# Patient Record
Sex: MALE | Race: WHITE | NOT HISPANIC OR LATINO | Employment: FULL TIME | URBAN - METROPOLITAN AREA
[De-identification: names, ages, dates, MRNs, and addresses within clinical notes are randomized per-mention and may not be internally consistent; named-entity substitution may affect disease eponyms.]

---

## 2017-07-18 ENCOUNTER — OFFICE VISIT (OUTPATIENT)
Dept: FAMILY MEDICINE | Facility: CLINIC | Age: 37
End: 2017-07-18

## 2017-07-18 ENCOUNTER — HOSPITAL ENCOUNTER (OUTPATIENT)
Dept: LAB | Facility: CLINIC | Age: 37
Discharge: HOME | End: 2017-07-18
Attending: FAMILY MEDICINE | Admitting: FAMILY MEDICINE
Payer: COMMERCIAL

## 2017-07-18 VITALS
OXYGEN SATURATION: 97 % | TEMPERATURE: 98.4 F | WEIGHT: 183 LBS | HEIGHT: 65 IN | HEART RATE: 65 BPM | BODY MASS INDEX: 30.49 KG/M2 | DIASTOLIC BLOOD PRESSURE: 70 MMHG | SYSTOLIC BLOOD PRESSURE: 110 MMHG

## 2017-07-18 DIAGNOSIS — Z13.1 SCREENING FOR DIABETES MELLITUS: ICD-10-CM

## 2017-07-18 DIAGNOSIS — Z00.00 ROUTINE GENERAL MEDICAL EXAMINATION AT A HEALTH CARE FACILITY: Primary | ICD-10-CM

## 2017-07-18 DIAGNOSIS — Z13.220 LIPID SCREENING: ICD-10-CM

## 2017-07-18 DIAGNOSIS — D69.6 THROMBOCYTOPENIA (H): Primary | ICD-10-CM

## 2017-07-18 DIAGNOSIS — D69.6 THROMBOCYTOPENIA (H): ICD-10-CM

## 2017-07-18 DIAGNOSIS — L80 VITILIGO: ICD-10-CM

## 2017-07-18 LAB
ALBUMIN SERPL-MCNC: 4.1 G/DL (ref 3.4–5)
ALP SERPL-CCNC: 80 U/L (ref 40–150)
ALT SERPL W P-5'-P-CCNC: 126 U/L (ref 0–70)
ANION GAP SERPL CALCULATED.3IONS-SCNC: 9 MMOL/L (ref 3–14)
AST SERPL W P-5'-P-CCNC: 51 U/L (ref 0–45)
BILIRUB SERPL-MCNC: 1.1 MG/DL (ref 0.2–1.3)
BUN SERPL-MCNC: 18 MG/DL (ref 7–30)
CALCIUM SERPL-MCNC: 8.9 MG/DL (ref 8.5–10.1)
CHLORIDE SERPL-SCNC: 110 MMOL/L (ref 94–109)
CHOLEST SERPL-MCNC: 222 MG/DL
CO2 SERPL-SCNC: 24 MMOL/L (ref 20–32)
CREAT SERPL-MCNC: 1.08 MG/DL (ref 0.66–1.25)
ERYTHROCYTE [DISTWIDTH] IN BLOOD BY AUTOMATED COUNT: 15.4 % (ref 10–15)
GFR SERPL CREATININE-BSD FRML MDRD: 77 ML/MIN/1.7M2
GLUCOSE SERPL-MCNC: 96 MG/DL (ref 70–99)
HCT VFR BLD AUTO: 43.8 % (ref 40–53)
HDLC SERPL-MCNC: 56 MG/DL
HGB BLD-MCNC: 15.2 G/DL (ref 13.3–17.7)
LDLC SERPL CALC-MCNC: 141 MG/DL
MCH RBC QN AUTO: 30 PG (ref 26.5–33)
MCHC RBC AUTO-ENTMCNC: 34.7 G/DL (ref 31.5–36.5)
MCV RBC AUTO: 86 FL (ref 78–100)
NONHDLC SERPL-MCNC: 166 MG/DL
PLATELET # BLD AUTO: 32 10E9/L (ref 150–450)
POTASSIUM SERPL-SCNC: 4.4 MMOL/L (ref 3.4–5.3)
PROT SERPL-MCNC: 7 G/DL (ref 6.8–8.8)
RBC # BLD AUTO: 5.07 10E12/L (ref 4.4–5.9)
SODIUM SERPL-SCNC: 143 MMOL/L (ref 133–144)
TRIGL SERPL-MCNC: 126 MG/DL
WBC # BLD AUTO: 9.1 10E9/L (ref 4–11)

## 2017-07-18 PROCEDURE — 80053 COMPREHEN METABOLIC PANEL: CPT | Performed by: FAMILY MEDICINE

## 2017-07-18 PROCEDURE — 99395 PREV VISIT EST AGE 18-39: CPT | Performed by: FAMILY MEDICINE

## 2017-07-18 PROCEDURE — 80061 LIPID PANEL: CPT | Performed by: FAMILY MEDICINE

## 2017-07-18 PROCEDURE — 36416 COLLJ CAPILLARY BLOOD SPEC: CPT | Performed by: FAMILY MEDICINE

## 2017-07-18 PROCEDURE — 85027 COMPLETE CBC AUTOMATED: CPT | Performed by: FAMILY MEDICINE

## 2017-07-18 NOTE — PROGRESS NOTES
SUBJECTIVE:   CC: Caesar Hernandez is an 36 year old male who presents for preventative health visit.     Healthy Habits:    Do you get at least three servings of calcium containing foods daily (dairy, green leafy vegetables, etc.)? yes    Amount of exercise or daily activities, outside of work: 1 day(s) per week    Problems taking medications regularly No    Medication side effects: No    Have you had an eye exam in the past two years? yes    Do you see a dentist twice per year? yes    Do you have sleep apnea, excessive snoring or daytime drowsiness?no            Today's PHQ-2 Score:   PHQ-2 ( 1999 [employer]) 7/18/2017 5/2/2014   Q1: Little interest or pleasure in doing things 0 0   Q2: Feeling down, depressed or hopeless 0 0   PHQ-2 Score 0 0       Abuse: Current or Past(Physical, Sexual or Emotional)- No  Do you feel safe in your environment - Yes    Social History   Substance Use Topics     Smoking status: Never Smoker     Smokeless tobacco: Never Used     Alcohol use Yes      Comment: rare use     The patient does not drink >3 drinks per day nor >7 drinks per week.    Last PSA: No results found for: PSA    Reviewed orders with patient. Reviewed health maintenance and updated orders accordingly - Yes  Labs reviewed in EPIC  BP Readings from Last 3 Encounters:   07/18/17 110/70   12/27/16 118/82   09/21/15 110/70    Wt Readings from Last 3 Encounters:   07/18/17 83 kg (183 lb)   12/27/16 78.5 kg (173 lb)   09/21/15 78.7 kg (173 lb 8 oz)                  Patient Active Problem List   Diagnosis     TAR syndrome: thrombocytopenia and absent radius     Family history of ischemic heart disease     Thrombocytopenia (H)     Health Care Home     ACP (advance care planning)     Foot deformity, congenital     Vitiligo     Past Surgical History:   Procedure Laterality Date     EXTRACTION(S) DENTAL Bilateral 6/24/2015    Procedure: EXTRACTION(S) DENTAL;  Surgeon: Selam Castillo DMD;  Location: [facility] OR     HERNIA REPAIR        TESTICLE SURGERY      undescended on right       Social History   Substance Use Topics     Smoking status: Never Smoker     Smokeless tobacco: Never Used     Alcohol use Yes      Comment: rare use     Family History   Problem Relation Age of Onset     HEART DISEASE Maternal Grandfather      HEART DISEASE Paternal Grandfather      Chemical Addiction Maternal Grandfather      DIABETES No family hx of          Current Outpatient Prescriptions   Medication Sig Dispense Refill     Omeprazole (PRILOSEC PO) Take 20 mg by mouth every morning       order for DME Equipment being ordered: orthotics for both feet, custom 1 Device 0     ELIDEL 1 % cream   3     triamcinolone (KENALOG) 0.1 % cream   2     Allergies   Allergen Reactions     Aspirin      Recent Labs   Lab Test  05/18/15   1055  03/21/14   1004   LDL  111  139*   HDL  56  49   TRIG  93  96   ALT  62   --    CR  0.98   --    GFRESTIMATED  87   --    GFRESTBLACK  >90   GFR Calc     --    POTASSIUM  3.7   --         Reviewed and updated as needed this visit by clinical staff  Tobacco  Allergies  Problems         Reviewed and updated as needed this visit by Provider        Past Medical History:   Diagnosis Date     Coagulation disorder (H)      Exercise-induced asthma 3/20/2014     Gastro-oesophageal reflux disease      Heart murmur      Other chronic pain     foot pain bilat     Uncomplicated asthma       Past Surgical History:   Procedure Laterality Date     EXTRACTION(S) DENTAL Bilateral 6/24/2015    Procedure: EXTRACTION(S) DENTAL;  Surgeon: Selam Castillo DMD;  Location: [facility] OR     HERNIA REPAIR       TESTICLE SURGERY      undescended on right       ROS:  C: NEGATIVE for fever, chills, change in weight  I: NEGATIVE for worrisome rashes, moles or lesions  E: NEGATIVE for vision changes or irritation  ENT: NEGATIVE for ear, mouth and throat problems  R: NEGATIVE for significant cough or SOB  CV: NEGATIVE for chest pain, palpitations or  "peripheral edema  GI: NEGATIVE for nausea, abdominal pain, heartburn, or change in bowel habits   male: negative for dysuria, hematuria, decreased urinary stream, erectile dysfunction, urethral discharge  M: NEGATIVE for significant arthralgias or myalgia  N: NEGATIVE for weakness, dizziness or paresthesias  P: NEGATIVE for changes in mood or affect    OBJECTIVE:   /70 (BP Location: Left arm, Patient Position: Chair, Cuff Size: Adult Regular)  Pulse 65  Temp 98.4  F (36.9  C) (Oral)  Ht 1.651 m (5' 5\")  Wt 83 kg (183 lb)  SpO2 97%  BMI 30.45 kg/m2  EXAM:  GENERAL: healthy, alert and no distress  EYES: Eyes grossly normal to inspection, PERRL and conjunctivae and sclerae normal  HENT: ear canals and TM's normal, nose and mouth without ulcers or lesions  NECK: no adenopathy, no asymmetry, masses, or scars and thyroid normal to palpation  RESP: lungs clear to auscultation - no rales, rhonchi or wheezes  CV: regular rate and rhythm, normal S1 S2, no S3 or S4, no murmur, click or rub, no peripheral edema and peripheral pulses strong  ABDOMEN: soft, nontender, no hepatosplenomegaly, no masses and bowel sounds normal   (male): normal male genitalia without lesions or urethral discharge, no hernia  RECTAL (male): deferred  MS: stable radius deformities-absent radius  SKIN: no suspicious lesions or rashes, slight vitiligo legs and arms  NEURO: Normal strength and tone, mentation intact and speech normal  PSYCH: mentation appears normal, affect normal/bright    ASSESSMENT/PLAN:   (Z00.00) Routine general medical examination at a health care facility  (primary encounter diagnosis)  Comment: discussed preventitive healthcare   Plan: Lipid Profile ([facility]), COMPREHENSIVE METABOLIC         PANEL ([facility]) XCMP, VENOUS COLLECTION        Continue to work on healthy diet and exercise, discussed healthy habits     (D69.6) Thrombocytopenia (H)  Comment: recheck today  Plan: CL AFF HEMOGRAM/PLATE/DIFF (B[facility])        " "    (L80) Vitiligo  Comment: mild flares  Plan: elidel as needed    COUNSELING:  Reviewed preventive health counseling, as reflected in patient instructions       Regular exercise       Healthy diet/nutrition       Vision screening         reports that he has never smoked. He has never used smokeless tobacco.    Estimated body mass index is 30.45 kg/(m^2) as calculated from the following:    Height as of this encounter: 1.651 m (5' 5\").    Weight as of this encounter: 83 kg (183 lb).   Weight management plan: Discussed healthy diet and exercise guidelines and patient will follow up in 12 months in clinic to re-evaluate.    Counseling Resources:  ATP IV Guidelines  Pooled Cohorts Equation Calculator  FRAX Risk Assessment  ICSI Preventive Guidelines  Dietary Guidelines for Americans, 2010  USDA's MyPlate  ASA Prophylaxis  Lung CA Screening    Jose Vicente MD  [facility]  "

## 2017-07-18 NOTE — NURSING NOTE
Caesar is here for CPX fasting    .Patient is here for a full physical exam.    Pre-Visit Screening :  Immunizations : up to date  Colon Screening : NA  Mammogram:NA  Asthma Action Test/Plan : NA states exercise induced only  PHQ9/GAD7 :  None  Fall Risk Assessment NA    Vitals:  Pulse - regular  My Chart - accepts    CLASSIFICATION OF OVERWEIGHT AND OBESITY BY BMI                         Obesity Class           BMI(kg/m2)  Underweight                                    < 18.5  Normal                                         18.5-24.9  Overweight                                     25.0-29.9  OBESITY                     I                  30.0-34.9                              II                 35.0-39.9  EXTREME OBESITY             III                >40                             Patient's  BMI Body mass index is 30.45 kg/(m^2).  http://hin.nhlbi.nih.gov/menuplanner/menu.cgi  Questioned patient about current smoking habits.  Pt. has never smoked.    ETOH screening:  Questions:  1-How often do you have a drink containing alcohol?                             1 times per month(s)  2-How many drinks containing alcohol do you have on a typical day when you are         Drinking?                              1   3- How often do you have 5 or more drinks on one occasion?                              Never    Have you ever:  None of the patient's responses to the CAGE screening were positive / Negative CAGE score

## 2017-07-18 NOTE — MR AVS SNAPSHOT
After Visit Summary   7/18/2017    Caesar Hernandez    MRN: [id number]           Patient Information     Date Of Birth          1980        Visit Information        Provider Department      7/18/2017 9:30 AM Jose Vicente MD [facility]        Today's Diagnoses     Routine general medical examination at a health care facility    -  1    Thrombocytopenia (H)        Vitiligo           Follow-ups after your visit        Follow-up notes from your care team     Return in about 1 year (around 7/18/2018).      Future tests that were ordered for you today     Open Standing Orders        Priority Remaining Interval Expires Ordered    CL AFF HEMOGRAM/PLATE/DIFF ([facility]) Routine 1/1 8/18/2017 7/18/2017    Lipid Profile ([facility]) Routine 1/1 8/18/2017 7/18/2017    COMPREHENSIVE METABOLIC PANEL ([facility]) XCMP Routine 1/1 8/18/2017 7/18/2017    VENOUS COLLECTION Routine 1/1 8/18/2017 7/18/2017            Who to contact     If you have questions or need follow up information about today's clinic visit or your schedule please contact [facility] directly at [phone].  Normal or non-critical lab and imaging results will be communicated to you by Radialpointhart, letter or phone within 4 business days after the clinic has received the results. If you do not hear from us within 7 days, please contact the clinic through Radialpointhart or phone. If you have a critical or abnormal lab result, we will notify you by phone as soon as possible.  Submit refill requests through YODIL or call your pharmacy and they will forward the refill request to us. Please allow 3 business days for your refill to be completed.          Additional Information About Your Visit        MyChart Information     YODIL gives you secure access to your electronic health record. If you see a primary care provider, you can also send messages to your care team and make appointments. If you have questions,  "please call your primary care clinic.  If you do not have a primary care provider, please call [phone] and they will assist you.        Care EveryWhere ID     This is your Care EveryWhere ID. This could be used by other organizations to access your [facility] medical records  [id number]        Your Vitals Were     Pulse Temperature Height Pulse Oximetry BMI (Body Mass Index)       65 98.4  F (36.9  C) (Oral) 1.651 m (5' 5\") 97% 30.45 kg/m2        Blood Pressure from Last 3 Encounters:   07/18/17 110/70   12/27/16 118/82   09/21/15 110/70    Weight from Last 3 Encounters:   07/18/17 83 kg (183 lb)   12/27/16 78.5 kg (173 lb)   09/21/15 78.7 kg (173 lb 8 oz)               Primary Care Provider Office Phone # Fax #    Jose Vicente [phone]       [facility] [address] [facility] [address]  [facility] [address]        Equal Access to Services     [facility]: Hadii aad ku hadasho Soomaali, waaxda luqadaha, qaybta kaalmada adeegyada, waxay idiin hayalfredon kareem angel . So [facility] [phone].    ATENCIÓN: Si habla español, tiene a kramer disposición servicios gratuitos de asistencia lingüística. Llame al [phone].    We comply with applicable federal civil rights laws and [place] laws. We do not discriminate on the basis of race, color, national origin, age, disability sex, sexual orientation or gender identity.            Thank you!     Thank you for choosing [facility]  for your care. Our goal is always to provide you with excellent care. Hearing back from our patients is one way we can continue to improve our services. Please take a few minutes to complete the written survey that you may receive in the mail after your visit with us. Thank you!             Your Updated Medication List - Protect others around you: Learn how to safely use, store and throw away your medicines at www.disposemymeds.org.          This list is accurate as of: " 7/18/17 10:00 AM.  Always use your most recent med list.                   Brand Name Dispense Instructions for use Diagnosis    ELIDEL 1 % cream   Generic drug:  pimecrolimus           order for DME     1 Device    Equipment being ordered: orthotics for both feet, custom    TAR syndrome, Foot deformity, congenital       PRILOSEC PO      Take 20 mg by mouth every morning        triamcinolone 0.1 % cream    KENALOG

## 2017-10-31 ENCOUNTER — OFFICE VISIT (OUTPATIENT)
Dept: FAMILY MEDICINE | Facility: CLINIC | Age: 37
End: 2017-10-31

## 2017-10-31 VITALS
WEIGHT: 179.2 LBS | RESPIRATION RATE: 20 BRPM | BODY MASS INDEX: 29.85 KG/M2 | SYSTOLIC BLOOD PRESSURE: 114 MMHG | TEMPERATURE: 98.5 F | HEIGHT: 65 IN | DIASTOLIC BLOOD PRESSURE: 74 MMHG

## 2017-10-31 DIAGNOSIS — H10.9 BACTERIAL CONJUNCTIVITIS OF LEFT EYE: Primary | ICD-10-CM

## 2017-10-31 PROCEDURE — 99213 OFFICE O/P EST LOW 20 MIN: CPT | Performed by: PHYSICIAN ASSISTANT

## 2017-10-31 RX ORDER — CIPROFLOXACIN HYDROCHLORIDE 3.5 MG/ML
SOLUTION/ DROPS TOPICAL
Qty: 1 BOTTLE | Refills: 0 | Status: SHIPPED | OUTPATIENT
Start: 2017-10-31 | End: 2017-11-05

## 2017-10-31 NOTE — MR AVS SNAPSHOT
"              After Visit Summary   10/31/2017    Caesar Hernandez    MRN: [id number]           Patient Information     Date Of Birth          1980        Visit Information        Provider Department      10/31/2017 10:00 AM Chelsea Mosqueda PA-C [facility]        Today's Diagnoses     Bacterial conjunctivitis of left eye    -  1       Follow-ups after your visit        Follow-up notes from your care team     Return if symptoms worsen or fail to improve.      Who to contact     If you have questions or need follow up information about today's clinic visit or your schedule please contact [facility] directly at [phone].  Normal or non-critical lab and imaging results will be communicated to you by MyChart, letter or phone within 4 business days after the clinic has received the results. If you do not hear from us within 7 days, please contact the clinic through Roadhophart or phone. If you have a critical or abnormal lab result, we will notify you by phone as soon as possible.  Submit refill requests through OpenLabel or call your pharmacy and they will forward the refill request to us. Please allow 3 business days for your refill to be completed.          Additional Information About Your Visit        MyChart Information     OpenLabel gives you secure access to your electronic health record. If you see a primary care provider, you can also send messages to your care team and make appointments. If you have questions, please call your primary care clinic.  If you do not have a primary care provider, please call [phone] and they will assist you.        Care EveryWhere ID     This is your Care EveryWhere ID. This could be used by other organizations to access your [facility] medical records  [id number]        Your Vitals Were     Temperature Respirations Height BMI (Body Mass Index)          98.5  F (36.9  C) (Oral) 20 1.657 m (5' 5.25\") 29.59 kg/m2         Blood " Pressure from Last 3 Encounters:   10/31/17 114/74   07/18/17 110/70   12/27/16 118/82    Weight from Last 3 Encounters:   10/31/17 81.3 kg (179 lb 3.2 oz)   07/18/17 83 kg (183 lb)   12/27/16 78.5 kg (173 lb)              Today, you had the following     No orders found for display         Today's Medication Changes          These changes are accurate as of: 10/31/17  1:48 PM.  If you have any questions, ask your nurse or doctor.               Start taking these medicines.        Dose/Directions    ciprofloxacin 0.3 % ophthalmic solution   Commonly known as:  CILOXAN   Used for:  Bacterial conjunctivitis of left eye   Started by:  Chelsea Mosqueda PA-C        Instill 1-2 drops in the affected eye(s) every 4 hours (3-4 times daily) for 5-7 days.   Quantity:  1 Bottle   Refills:  0            Where to get your medicines      These medications were sent to [facility] [id number] [address]    Hours:  Tech issues with their phone system Phone:  [phone]     ciprofloxacin 0.3 % ophthalmic solution                Primary Care Provider Office Phone # Fax #    Jose Vicente [phone] [address] [address]        Equal Access to Services     [facility]: Hadii aad ku hadasho Soomaali, waaxda luqadaha, qaybta kaalmada adeegyada, inge haque. So [facility] [phone].    ATENCIÓN: Si habla español, tiene a kramer disposición servicios gratuitos de asistencia lingüística. Aileen al [phone].    We comply with applicable federal civil rights laws and [place] laws. We do not discriminate on the basis of race, color, national origin, age, disability, sex, sexual orientation, or gender identity.            Thank you!     Thank you for choosing [facility]  for your care. Our goal is always to provide you with excellent care. Hearing back from  our patients is one way we can continue to improve our services. Please take a few minutes to complete the written survey that you may receive in the mail after your visit with us. Thank you!             Your Updated Medication List - Protect others around you: Learn how to safely use, store and throw away your medicines at www.disposemymeds.org.          This list is accurate as of: 10/31/17  1:48 PM.  Always use your most recent med list.                   Brand Name Dispense Instructions for use Diagnosis    ciprofloxacin 0.3 % ophthalmic solution    CILOXAN    1 Bottle    Instill 1-2 drops in the affected eye(s) every 4 hours (3-4 times daily) for 5-7 days.    Bacterial conjunctivitis of left eye       ELIDEL 1 % cream   Generic drug:  pimecrolimus           order for DME     1 Device    Equipment being ordered: orthotics for both feet, custom    TAR syndrome, Foot deformity, congenital       PRILOSEC PO      Take 20 mg by mouth every morning        triamcinolone 0.1 % cream    KENALOG

## 2017-10-31 NOTE — PROGRESS NOTES
Chief Complaint   Patient presents with     Eye Problem     Conjunctivitis        HPI  Caesar Hernandez is a 37 year old male with concern about the eye.    Symptoms include burning, redness, discharge and mattering in left eye for 2 days.     He  does complain of itching and/or tearing.     Vision affected? Yes  Foreign body sensation? (corneal process)  No  Photophobia? No  Trauma? No  Wear contact lenses? Yes  Discharge? Yes  Known Exposure? Yes    Prior ophthalmological history.     Past Medical History:   Diagnosis Date     Coagulation disorder (H)      Exercise-induced asthma 3/20/2014     Gastro-oesophageal reflux disease      Heart murmur      Other chronic pain     foot pain bilat     Uncomplicated asthma      Family History   Problem Relation Age of Onset     HEART DISEASE Maternal Grandfather      HEART DISEASE Paternal Grandfather      Chemical Addiction Maternal Grandfather      DIABETES No family hx of      Social History     Social History     Marital status:      Spouse name: reny     Number of children: 1     Years of education: N/A     Occupational History     teacher      [employer]     Social History Main Topics     Smoking status: Never Smoker     Smokeless tobacco: Never Used     Alcohol use Yes      Comment: rare use     Drug use: No     Sexual activity: Yes     Partners: Female     Other Topics Concern     Not on file     Social History Narrative     Patient Active Problem List   Diagnosis     TAR syndrome: thrombocytopenia and absent radius     Family history of ischemic heart disease     Thrombocytopenia (H)     Health Care Home     ACP (advance care planning)     Foot deformity, congenital     Vitiligo     Current Outpatient Prescriptions   Medication     ciprofloxacin (CILOXAN) 0.3 % ophthalmic solution     order for DME     ELIDEL 1 % cream     triamcinolone (KENALOG) 0.1 % cream     Omeprazole (PRILOSEC PO)     No current facility-administered medications for this visit.   "      Allergies:    Allergies   Allergen Reactions     Aspirin        OBJECTIVE    Vitals:    10/31/17 1006   BP: 114/74   BP Location: Left arm   Patient Position: Chair   Cuff Size: Adult Regular   Resp: 20   Temp: 98.5  F (36.9  C)   TempSrc: Oral   Weight: 81.3 kg (179 lb 3.2 oz)   Height: 1.657 m (5' 5.25\")       PHYSICAL EXAMINATION    Patient is pleasant appearing 37 year old male , in no acute distress. Vitals noted.      General: Patient appears well.   Eyes: left eye with findings of typical conjunctivitis noted; erythema. Discharge is not present. PERRLA, no foreign body noted. No periorbital cellulitis. The corneas are clear and fundi normal.   Pupil size normal  Discharge not present.  Ears: External ears and TMs normal BL.  Nose: Nasal mucosa pink and moist. No discharge.  Mouth / Throat: Mucous membranes moist. Normal dentition.  Pharynx non-erythematous, no exudates.   Neck: Supple, No thyromegaly or nodules. No lymphadenopathy.      ASSESSMENT  1. Bacterial conjunctivitis of left eye          PLAN  Antibiotic drops per order. Hygiene discussed.     Caesar will return to the clinic if symptoms are changing or concern with follow up as discussed.  The patient understands and agrees with the plan.    Chelsea Mosqueda"

## 2017-11-01 ENCOUNTER — OFFICE VISIT (OUTPATIENT)
Dept: FAMILY MEDICINE | Facility: CLINIC | Age: 37
End: 2017-11-01

## 2017-11-01 VITALS
OXYGEN SATURATION: 91 % | TEMPERATURE: 98.8 F | DIASTOLIC BLOOD PRESSURE: 76 MMHG | SYSTOLIC BLOOD PRESSURE: 104 MMHG | BODY MASS INDEX: 29.26 KG/M2 | HEART RATE: 96 BPM | WEIGHT: 177.2 LBS

## 2017-11-01 DIAGNOSIS — J06.9 UPPER RESPIRATORY TRACT INFECTION, UNSPECIFIED TYPE: Primary | ICD-10-CM

## 2017-11-01 PROCEDURE — 99213 OFFICE O/P EST LOW 20 MIN: CPT | Performed by: FAMILY MEDICINE

## 2017-11-01 NOTE — MR AVS SNAPSHOT
After Visit Summary   11/1/2017    Caesar Hernandez    MRN: [id number]           Patient Information     Date Of Birth          1980        Visit Information        Provider Department      11/1/2017 5:15 PM Jules, Jose Christopher, MD [facility]        Today's Diagnoses     Upper respiratory tract infection, unspecified type    -  1       Follow-ups after your visit        Who to contact     If you have questions or need follow up information about today's clinic visit or your schedule please contact [facility] directly at [phone].  Normal or non-critical lab and imaging results will be communicated to you by rVitahart, letter or phone within 4 business days after the clinic has received the results. If you do not hear from us within 7 days, please contact the clinic through rVitahart or phone. If you have a critical or abnormal lab result, we will notify you by phone as soon as possible.  Submit refill requests through CanDiag or call your pharmacy and they will forward the refill request to us. Please allow 3 business days for your refill to be completed.          Additional Information About Your Visit        MyChart Information     CanDiag gives you secure access to your electronic health record. If you see a primary care provider, you can also send messages to your care team and make appointments. If you have questions, please call your primary care clinic.  If you do not have a primary care provider, please call [phone] and they will assist you.        Care EveryWhere ID     This is your Care EveryWhere ID. This could be used by other organizations to access your [place] medical records  [id number]        Your Vitals Were     Pulse Temperature Pulse Oximetry BMI (Body Mass Index)          96 98.8  F (37.1  C) (Oral) 91% 29.26 kg/m2         Blood Pressure from Last 3 Encounters:   11/01/17 104/76   10/31/17 114/74   07/18/17 110/70     Weight from Last 3 Encounters:   11/01/17 80.4 kg (177 lb 3.2 oz)   10/31/17 81.3 kg (179 lb 3.2 oz)   07/18/17 83 kg (183 lb)              Today, you had the following     No orders found for display       Primary Care Provider Office Phone # Fax #    Jose Moisés Vicente [phone] [address] [facility] [address]  [facility] [address]        Equal Access to Services     [facility] : Hadii aad ku hadasho Soomaali, waaxda luqadaha, qaybta kaalmada adeegyada, waxay idiin hayaan adeeg kharash la'cody . So [facility] [phone].    ATENCIÓN: Si habla español, tiene a kramer disposición servicios gratuitos de asistencia lingüística. [facility] [phone].    We comply with applicable federal civil rights laws and [place] laws. We do not discriminate on the basis of race, color, national origin, age, disability, sex, sexual orientation, or gender identity.            Thank you!     Thank you for choosing [facility]  for your care. Our goal is always to provide you with excellent care. Hearing back from our patients is one way we can continue to improve our services. Please take a few minutes to complete the written survey that you may receive in the mail after your visit with us. Thank you!             Your Updated Medication List - Protect others around you: Learn how to safely use, store and throw away your medicines at www.disposemymeds.org.          This list is accurate as of: 11/1/17  5:22 PM.  Always use your most recent med list.                   Brand Name Dispense Instructions for use Diagnosis    ciprofloxacin 0.3 % ophthalmic solution    CILOXAN    1 Bottle    Instill 1-2 drops in the affected eye(s) every 4 hours (3-4 times daily) for 5-7 days.    Bacterial conjunctivitis of left eye       ELIDEL 1 % cream   Generic drug:  pimecrolimus           order for DME     1 Device    Equipment being ordered: orthotics for both feet, custom    TAR syndrome, Foot deformity,  congenital       PRILOSEC PO      Take 20 mg by mouth every morning        triamcinolone 0.1 % cream    KENALOG

## 2017-11-01 NOTE — PROGRESS NOTES
SUBJECTIVE:   Caesar Hernandez is a 37 year old male who complains of nasal congestion, sore throat, earache, cough, mattering conjuntivitis on both sides and hoarseness for 1-2 days-was seen here for conjuncitvitis yesterday but all the other symptoms started today. He denies a history of productive cough, sweats, wheezing, vomiting and chest pain and denies a history of asthma. Patient does not smoke cigarettes.    Dayquil, Tylenol x 1 today     OBJECTIVE:/76 (BP Location: Right leg, Patient Position: Chair, Cuff Size: Adult Regular)  Pulse 96  Temp 98.8  F (37.1  C) (Oral)  Wt 80.4 kg (177 lb 3.2 oz)  SpO2 91%  BMI 29.26 kg/m2   He appears well, vital signs are as noted by the nurse. Ears right wnl, left dull but no erythema, appears to have fluid pressure.  Throat and pharynx normal.  Neck supple. No adenopathy in the neck. Nose is congested. Sinuses non tender. The chest is clear, without wheezes or rales.    ASSESSMENT:   Viral upper respiratory illness    PLAN:  Symptomatic therapy suggested: push fluids, rest and use acetaminophen as needed. Call or return to clinic prn if these symptoms worsen or fail to improve as anticipated.     If left ear pain increases would likely treat for infection without OV

## 2017-11-01 NOTE — NURSING NOTE
Caesar is here for sore throat     Pre-Visit Screening :  Immunizations : up to date  Colon Screening : is up to date  Asthma Action Test/Plan : NA  PHQ9/GAD7 :  NA    Pulse - regular  My Chart - accepts    CLASSIFICATION OF OVERWEIGHT AND OBESITY BY BMI                         Obesity Class           BMI(kg/m2)  Underweight                                    < 18.5  Normal                                         18.5-24.9  Overweight                                     25.0-29.9  OBESITY                     I                  30.0-34.9                              II                 35.0-39.9  EXTREME OBESITY             III                >40                             Patient's  BMI Body mass index is 29.26 kg/(m^2).  http://hin.nhlbi.nih.gov/menuplanner/menu.cgi  Questioned patient about current smoking habits.  Pt. has never smoked.

## 2017-11-05 ENCOUNTER — OFFICE VISIT (OUTPATIENT)
Dept: URGENT CARE | Facility: URGENT CARE | Age: 37
End: 2017-11-05
Payer: COMMERCIAL

## 2017-11-05 ENCOUNTER — RADIANT APPOINTMENT (OUTPATIENT)
Dept: GENERAL RADIOLOGY | Facility: CLINIC | Age: 37
End: 2017-11-05
Attending: FAMILY MEDICINE
Payer: COMMERCIAL

## 2017-11-05 VITALS
OXYGEN SATURATION: 98 % | WEIGHT: 177 LBS | HEART RATE: 114 BPM | DIASTOLIC BLOOD PRESSURE: 68 MMHG | BODY MASS INDEX: 29.23 KG/M2 | TEMPERATURE: 99.3 F | SYSTOLIC BLOOD PRESSURE: 104 MMHG | RESPIRATION RATE: 14 BRPM

## 2017-11-05 DIAGNOSIS — R05.9 COUGH: Primary | ICD-10-CM

## 2017-11-05 DIAGNOSIS — R07.81 RIB PAIN: ICD-10-CM

## 2017-11-05 DIAGNOSIS — R05.9 COUGH: ICD-10-CM

## 2017-11-05 PROCEDURE — 71020 XR CHEST 2 VW: CPT

## 2017-11-05 PROCEDURE — 99214 OFFICE O/P EST MOD 30 MIN: CPT | Performed by: FAMILY MEDICINE

## 2017-11-05 RX ORDER — PREDNISONE 20 MG/1
40 TABLET ORAL DAILY
Qty: 10 TABLET | Refills: 0 | Status: SHIPPED | OUTPATIENT
Start: 2017-11-05 | End: 2017-11-10

## 2017-11-05 RX ORDER — CODEINE PHOSPHATE AND GUAIFENESIN 10; 100 MG/5ML; MG/5ML
1 SOLUTION ORAL EVERY 4 HOURS PRN
Qty: 120 ML | Refills: 0 | Status: SHIPPED | OUTPATIENT
Start: 2017-11-05 | End: 2018-08-02

## 2017-11-05 RX ORDER — AZITHROMYCIN 250 MG/1
TABLET, FILM COATED ORAL
Qty: 6 TABLET | Refills: 0 | Status: SHIPPED | OUTPATIENT
Start: 2017-11-05 | End: 2018-08-02

## 2017-11-05 NOTE — PROGRESS NOTES
SUBJECTIVE:  Caesar Hernandez, a 37 year old male scheduled an appointment to discuss the following issues:    Cough    Medical, social, surgical, and family histories reviewed.    ROS:  CONSTITUTIONAL:low grade fever  E: NEGATIVE for vision changes   RESP:chronic cough and coughing jags  CV: NEGATIVE for chest pain, palpitations   GI: NEGATIVE for nausea, abdominal pain, heartburn, or change in bowel habits  : NEGATIVE for frequency, dysuria, or hematuria  M: NEGATIVE for significant arthralgias or myalgia  N: NEGATIVE for weakness, dizziness or paresthesias or headache    OBJECTIVE:  /68 (BP Location: Right arm, Cuff Size: Adult Large)  Pulse 114  Temp 99.3  F (37.4  C) (Oral)  Resp 14  Wt 177 lb (80.3 kg)  SpO2 98%  BMI 29.23 kg/m2  EXAM:  GENERAL APPEARANCE: mild distress  EYES: EOMI,  PERRL  HENT: ear canals and TM's normal and nose and mouth without ulcers or lesions  RESP: course sounds  CV: regular rates and rhythm, normal S1 S2, no S3 or S4 and no murmur, click or rub -  ABDOMEN:  soft, nontender, no HSM or masses and bowel sounds normal  NEURO: Normal strength and tone, sensory exam grossly normal, mentation intact and speech normal      Results for orders placed or performed during the hospital encounter of 07/18/17   CBC with platelets   Result Value Ref Range    WBC 9.1 4.0 - 11.0 10e9/L    RBC Count 5.07 4.4 - 5.9 10e12/L    Hemoglobin 15.2 13.3 - 17.7 g/dL    Hematocrit 43.8 40.0 - 53.0 %    MCV 86 78 - 100 fl    MCH 30.0 26.5 - 33.0 pg    MCHC 34.7 31.5 - 36.5 g/dL    RDW 15.4 (H) 10.0 - 15.0 %    Platelet Count 32 (LL) 150 - 450 10e9/L   Comprehensive metabolic panel (BMP + Alb, Alk Phos, ALT, AST, Total. Bili, TP)   Result Value Ref Range    Sodium 143 133 - 144 mmol/L    Potassium 4.4 3.4 - 5.3 mmol/L    Chloride 110 (H) 94 - 109 mmol/L    Carbon Dioxide 24 20 - 32 mmol/L    Anion Gap 9 3 - 14 mmol/L    Glucose 96 70 - 99 mg/dL    Urea Nitrogen 18 7 - 30 mg/dL    Creatinine 1.08 0.66 -  1.25 mg/dL    GFR Estimate 77 >60 mL/min/1.7m2    GFR Estimate If Black >90   GFR Calc   >60 mL/min/1.7m2    Calcium 8.9 8.5 - 10.1 mg/dL    Bilirubin Total 1.1 0.2 - 1.3 mg/dL    Albumin 4.1 3.4 - 5.0 g/dL    Protein Total 7.0 6.8 - 8.8 g/dL    Alkaline Phosphatase 80 40 - 150 U/L     (H) 0 - 70 U/L    AST 51 (H) 0 - 45 U/L   Lipid Profile (Chol, Trig, HDL, LDL calc)   Result Value Ref Range    Cholesterol 222 (H) <200 mg/dL    Triglycerides 126 <150 mg/dL    HDL Cholesterol 56 >39 mg/dL    LDL Cholesterol Calculated 141 (H) <100 mg/dL    Non HDL Cholesterol 166 (H) <130 mg/dL     CXR; lung fields are clear, no evidence of a fracture in the ribs. No evidence for pneumothorax    ASSESSMENT/PLAN:  (R05) Cough  (primary encounter diagnosis)  Comment:   Plan:     37-year-old with cough symptomatic of chest pain that is probably from his chronic cough.    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

## 2017-11-05 NOTE — NURSING NOTE
"Caesar Hernandez is a 37 year old male.      Chief Complaint   Patient presents with     Urgent Care     Cough     was seen on Mon and was treated for eye infection - then seen again on Wed and thought it was a virus - now the cough is getting wrose and running a fever - facial pain and pressure and ear pain       Initial /68 (BP Location: Right arm, Cuff Size: Adult Large)  Pulse 114  Temp 99.3  F (37.4  C) (Oral)  Resp 14  Wt 177 lb (80.3 kg)  SpO2 98%  BMI 29.23 kg/m2 Estimated body mass index is 29.23 kg/(m^2) as calculated from the following:    Height as of 10/31/17: 5' 5.25\" (1.657 m).    Weight as of this encounter: 177 lb (80.3 kg).  Medication Reconciliation: complete      Questioned patient about current smoking habits.  Pt. has never smoked.      Lizz Quintanilla CMA      "

## 2017-11-05 NOTE — PATIENT INSTRUCTIONS
1. Increase your Fluids  2. Cough medication mainly at night to suppress this to allow you to sleep  3. Antibiotics  4. Steroid medication for suppression of the irritation in your lungs  5. Follow-up with Dr. Vicente within a week

## 2017-11-05 NOTE — MR AVS SNAPSHOT
After Visit Summary   11/5/2017    Caesar Hernandez    MRN: [id number]           Patient Information     Date Of Birth          1980        Visit Information        Provider Department      11/5/2017 9:50 AM Alejo Clark MD [facility]        Today's Diagnoses     Cough    -  1      Care Instructions    1. Increase your Fluids  2. Cough medication mainly at night to suppress this to allow you to sleep  3. Antibiotics  4. Steroid medication for suppression of the irritation in your lungs  5. Follow-up with Dr. Vicente within a week          Follow-ups after your visit        Who to contact     If you have questions or need follow up information about today's clinic visit or your schedule please contact [facility] directly at [phone].  Normal or non-critical lab and imaging results will be communicated to you by CareFlashhart, letter or phone within 4 business days after the clinic has received the results. If you do not hear from us within 7 days, please contact the clinic through WHI Solutiont or phone. If you have a critical or abnormal lab result, we will notify you by phone as soon as possible.  Submit refill requests through Fusion Garage or call your pharmacy and they will forward the refill request to us. Please allow 3 business days for your refill to be completed.          Additional Information About Your Visit        MyChart Information     Fusion Garage gives you secure access to your electronic health record. If you see a primary care provider, you can also send messages to your care team and make appointments. If you have questions, please call your primary care clinic.  If you do not have a primary care provider, please call [phone] and they will assist you.        Care EveryWhere ID     This is your Care EveryWhere ID. This could be used by other organizations to access your [facility] medical records  [id number]        Your Vitals Were     Pulse Temperature  Respirations Pulse Oximetry BMI (Body Mass Index)       114 99.3  F (37.4  C) (Oral) 14 98% 29.23 kg/m2        Blood Pressure from Last 3 Encounters:   11/05/17 104/68   11/01/17 104/76   10/31/17 114/74    Weight from Last 3 Encounters:   11/05/17 177 lb (80.3 kg)   11/01/17 177 lb 3.2 oz (80.4 kg)   10/31/17 179 lb 3.2 oz (81.3 kg)                 Today's Medication Changes          These changes are accurate as of: 11/5/17 10:31 AM.  If you have any questions, ask your nurse or doctor.               Start taking these medicines.        Dose/Directions    acetaminophen-codeine 300-30 MG per tablet   Commonly known as:  TYLENOL #3   Used for:  Cough   Started by:  Alejo Clark MD        Dose:  1 tablet   Take 1 tablet by mouth every 6 hours as needed for pain maximum 3 tablet(s) per day   Quantity:  10 tablet   Refills:  0       azithromycin 250 MG tablet   Commonly known as:  ZITHROMAX   Used for:  Cough   Started by:  Alejo Clark MD        Two tablets first day, then one tablet daily for four days.   Quantity:  6 tablet   Refills:  0       guaiFENesin-codeine 100-10 MG/5ML Soln solution   Commonly known as:  ROBITUSSIN AC   Used for:  Cough   Started by:  Alejo Clark MD        Dose:  1 tsp.   Take 5 mLs by mouth every 4 hours as needed for cough   Quantity:  120 mL   Refills:  0       predniSONE 20 MG tablet   Commonly known as:  DELTASONE   Used for:  Cough   Started by:  Alejo Clark MD        Dose:  40 mg   Take 2 tablets (40 mg) by mouth daily for 5 days   Quantity:  10 tablet   Refills:  0            Where to get your medicines      These medications were sent to [facility]/pharmacy #[id number] - [place] - [address]  [address]  [address], [facility] [address]     Phone:  [phone]     azithromycin 250 MG tablet    predniSONE 20 MG tablet         Some of these will need a paper prescription and others can be bought over the counter.  Ask your nurse if you have questions.     Bring a  paper prescription for each of these medications     acetaminophen-codeine 300-30 MG per tablet    guaiFENesin-codeine 100-10 MG/5ML Soln solution                Primary Care Provider Office Phone # Fax #    Jose Vicente [phone] [address] [facility] [address]  [facility] [address]        Equal Access to Services     [facility]: Hadii aad ku hadasho Soomaali, waaxda luqadaha, qaybta kaalmada adeegyada, waxay idiin hayaan adeeg kharash la'aan . So [facility] [phone].    ATENCIÓN: Si habla español, tiene a kramer disposición servicios gratuitos de asistencia lingüística. Aileen al [phone].    We comply with applicable federal civil rights laws and [place] laws. We do not discriminate on the basis of race, color, national origin, age, disability, sex, sexual orientation, or gender identity.            Thank you!     Thank you for choosing [facility]  for your care. Our goal is always to provide you with excellent care. Hearing back from our patients is one way we can continue to improve our services. Please take a few minutes to complete the written survey that you may receive in the mail after your visit with us. Thank you!             Your Updated Medication List - Protect others around you: Learn how to safely use, store and throw away your medicines at www.disposemymeds.org.          This list is accurate as of: 11/5/17 10:31 AM.  Always use your most recent med list.                   Brand Name Dispense Instructions for use Diagnosis    acetaminophen-codeine 300-30 MG per tablet    TYLENOL #3    10 tablet    Take 1 tablet by mouth every 6 hours as needed for pain maximum 3 tablet(s) per day    Cough       azithromycin 250 MG tablet    ZITHROMAX    6 tablet    Two tablets first day, then one tablet daily for four days.    Cough       ELIDEL 1 % cream   Generic drug:  pimecrolimus           guaiFENesin-codeine 100-10 MG/5ML Soln solution    ROBITUSSIN AC     120 mL    Take 5 mLs by mouth every 4 hours as needed for cough    Cough       order for DME     1 Device    Equipment being ordered: orthotics for both feet, custom    TAR syndrome, Foot deformity, congenital       predniSONE 20 MG tablet    DELTASONE    10 tablet    Take 2 tablets (40 mg) by mouth daily for 5 days    Cough       PRILOSEC PO      Take 20 mg by mouth every morning        triamcinolone 0.1 % cream    KENALOG

## 2017-11-23 ENCOUNTER — TELEPHONE (OUTPATIENT)
Dept: URGENT CARE | Facility: URGENT CARE | Age: 37
End: 2017-11-23

## 2017-11-23 NOTE — TELEPHONE ENCOUNTER
Patient called today.    Patient is requesting medications Predizone and Zythomax.    Patient still has persistent cough.    Please contact patient.    Thank you.    Central Scheduling  Kerri BARKER

## 2017-11-24 ENCOUNTER — TELEPHONE (OUTPATIENT)
Dept: FAMILY MEDICINE | Facility: CLINIC | Age: 37
End: 2017-11-24

## 2017-11-24 ENCOUNTER — NURSE TRIAGE (OUTPATIENT)
Dept: NURSING | Facility: CLINIC | Age: 37
End: 2017-11-24

## 2017-11-24 NOTE — TELEPHONE ENCOUNTER
Additional Information    Negative: [1] Caller is not with the adult (patient) AND [2] reporting urgent symptoms    Negative: Lab result questions    Negative: Medication questions    Negative: Caller cannot be reached by phone    Negative: Caller has already spoken to PCP or another triager    Negative: RN needs further essential information from caller in order to complete triage    Negative: Requesting regular office appointment    Negative: [1] Caller requesting NON-URGENT health information AND [2] PCP's office is the best resource    Negative: Health Information question, no triage required and triager able to answer question    Negative: General information question, no triage required and triager able to answer question    Negative: Question about upcoming scheduled test, no triage required and triager able to answer question    Negative: [1] Caller is not with the adult (patient) AND [2] probable NON-URGENT symptoms    [1] Follow-up call to recent contact AND [2] information only call, no triage required    Protocols used: INFORMATION ONLY CALL-ADULT-    Caesar calls and says that [facility], and was prescribed medications. Caesar says that he thought he was getting better, but he is still coughing. Caesar says that he wants the medications that the  ordered for him reordered. Caesar refused triage and was demanding to get those medications. RN then called that [facility] clinic and spoke to Alondra (nurse). Alondra says that she will call Caesar back now. RN then gave Alondra Maynard's phone #. RN spoke to Caesar and told Caesar that Alondra was going to call him back very shortly. Caesar voiced understanding.

## 2017-11-24 NOTE — TELEPHONE ENCOUNTER
Caesar Hernandez called the clinic support line with the following:    States that he needs a refill of Z-marge and Prednisone. Called Pt back and advised an OV here since we did not prescribe. He will wait for UC to call him back

## 2017-11-25 ENCOUNTER — OFFICE VISIT (OUTPATIENT)
Dept: FAMILY MEDICINE | Facility: CLINIC | Age: 37
End: 2017-11-25

## 2017-11-25 VITALS
DIASTOLIC BLOOD PRESSURE: 56 MMHG | TEMPERATURE: 98.3 F | RESPIRATION RATE: 16 BRPM | OXYGEN SATURATION: 94 % | WEIGHT: 174.6 LBS | HEART RATE: 88 BPM | BODY MASS INDEX: 29.09 KG/M2 | HEIGHT: 65 IN | SYSTOLIC BLOOD PRESSURE: 104 MMHG

## 2017-11-25 DIAGNOSIS — K52.9 GASTROENTERITIS: ICD-10-CM

## 2017-11-25 DIAGNOSIS — J06.9 VIRAL UPPER RESPIRATORY TRACT INFECTION: ICD-10-CM

## 2017-11-25 DIAGNOSIS — J20.9 ACUTE BRONCHITIS WITH SYMPTOMS > 10 DAYS: Primary | ICD-10-CM

## 2017-11-25 DIAGNOSIS — F43.0 ACUTE REACTION TO STRESS: ICD-10-CM

## 2017-11-25 PROCEDURE — 99213 OFFICE O/P EST LOW 20 MIN: CPT | Performed by: FAMILY MEDICINE

## 2017-11-25 RX ORDER — AZITHROMYCIN 250 MG/1
TABLET, FILM COATED ORAL
Qty: 6 TABLET | Refills: 0 | Status: SHIPPED | OUTPATIENT
Start: 2017-11-25 | End: 2018-08-02

## 2017-11-25 RX ORDER — MONTELUKAST SODIUM 10 MG/1
10 TABLET ORAL AT BEDTIME
Qty: 30 TABLET | Refills: 0 | Status: SHIPPED | OUTPATIENT
Start: 2017-11-25 | End: 2019-03-25

## 2017-11-25 NOTE — PATIENT INSTRUCTIONS
Acute bronchitis with symptoms > 10 days  (primary encounter diagnosis)  Comment: inflammation reviewed versus bacterial infection  Plan: azithromycin (ZITHROMAX) 250 MG tablet,         montelukast (SINGULAIR) 10 MG tablet        Symptomatic care with decongestants, fluids, tylenol prn. Use GUAIFENESIN  MG OR TBCR, 1 tab po BID (Twice per day), D: 20, R: 0 for congestion and cough.    In addition, I have suggested that the patient   monitor for symptoms of bacterial infection expecting slow gradual resolution of viral URI as the natural course.  Follow diarrhea diet and monitor for any worrisome symptoms

## 2017-11-25 NOTE — NURSING NOTE
Caesar is here for a cough and diarrhea,     Pre-Visit Screening :  Immunizations : up to date  Colon Screening : NA  Asthma Action Test/Plan : NA  PHQ9/GAD7 :  NA      Pulse - regular  My Chart - accepts    CLASSIFICATION OF OVERWEIGHT AND OBESITY BY BMI                         Obesity Class           BMI(kg/m2)  Underweight                                    < 18.5  Normal                                         18.5-24.9  Overweight                                     25.0-29.9  OBESITY                     I                  30.0-34.9                              II                 35.0-39.9  EXTREME OBESITY             III                >40                             Patient's  BMI Body mass index is 29.05 kg/(m^2).  http://hin.nhlbi.nih.gov/menuplanner/menu.cgi  Questioned patient about current smoking habits.  Pt. has never smoked.  The patient has verbalized that it is ok to leave a detailed voice message on the patient's cell phone with results/recommendations from this visit.     Verified cell phone number.     CHANTELLE Morgan ([facility])

## 2017-11-25 NOTE — PROGRESS NOTES
"SUBJECTIVE: 37 year old male complaining of nasal congestion,cough and sore throat seen at our office for conjunctivitis, then URI with cough with ear pressure that brought him to urgent care. started all symptoms 1 month(s). At urgent care given steroids and azithromycin.  The patient describes symptoms never really went away but did get better.  This week fatigue and dry cough. Last 3 days soft stools 3 times daily and coughing brings on bowel movement. Coughing with exertion.    He is angry urgent care or Dr Vicente did not refill his medications again.     The patient denies a history of fever, emesis, SOB  or rash. No melena or weight loss  Smoking history: No.   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 Exercise induced asthma as a kid. Thrombocytopenia/ unable to use Asprin or NSAID    OBJECTIVE: The patient appears healthy, alert, no distress, cooperative and smiling. Obvious arm deformities  Vital signs:  Temp: 98.3  F (36.8  C) Temp src: Oral BP: 104/56 Pulse: 88   Resp: 16 SpO2: 94 %     Height: 165.1 cm (5' 5\") Weight: 79.2 kg (174 lb 9.6 oz)  Estimated body mass index is 29.05 kg/(m^2) as calculated from the following:    Height as of this encounter: 1.651 m (5' 5\").    Weight as of this encounter: 79.2 kg (174 lb 9.6 oz).      EARS: negative  NOSE/SINUS: positive findings: mucosa erythematous and swollen, clear rhinorrhea   THROAT: normal and post nasal drainage   NECK:Neck supple. No adenopathy. Thyroid symmetric, normal size,, Carotids without bruits.   CHEST: Clear with dry cough  Abd: The abdomen is soft without tenderness, guarding, mass or organomegaly. Bowel sounds are normal. No CVA tenderness or inguinal adenopathy noted.  No weight change    ASSESSMENT: (J20.9) Acute bronchitis with symptoms > 10 days  (primary encounter diagnosis)  Comment: inflammation reviewed versus bacterial infection  Plan: azithromycin (ZITHROMAX) 250 MG tablet,         montelukast (SINGULAIR) 10 MG " tablet        Symptomatic care with decongestants, fluids, tylenol prn. Use GUAIFENESIN  MG OR TBCR, 1 tab po BID (Twice per day), D: 20, R: 0 for congestion and cough.    In addition, I have suggested that the patient   monitor for symptoms of bacterial infection expecting slow gradual resolution of viral URI as the natural course.      (J06.9,  B97.89) Viral upper respiratory tract infection  Plan: as above    (K52.9) Gastroenteritis  Plan: monitor for worrisome symptoms. Follow diarrhea diet handout.      Support. He is very angry about coming in today. Explained good medical care and the need to monitor for worrisome symptoms. Also the desire to not overuse antibiotics.

## 2017-11-25 NOTE — MR AVS SNAPSHOT
After Visit Summary   11/25/2017    Caesar Hernandez    MRN: [id number]           Patient Information     Date Of Birth          1980        Visit Information        Provider Department      11/25/2017 10:00 AM Janay Adler MD [facility]        Today's Diagnoses     Acute bronchitis with symptoms > 10 days    -  1    Viral upper respiratory tract infection        Gastroenteritis        Acute reaction to stress          Care Instructions    Acute bronchitis with symptoms > 10 days  (primary encounter diagnosis)  Comment: inflammation reviewed versus bacterial infection  Plan: azithromycin (ZITHROMAX) 250 MG tablet,         montelukast (SINGULAIR) 10 MG tablet        Symptomatic care with decongestants, fluids, tylenol prn. Use GUAIFENESIN  MG OR TBCR, 1 tab po BID (Twice per day), D: 20, R: 0 for congestion and cough.    In addition, I have suggested that the patient   monitor for symptoms of bacterial infection expecting slow gradual resolution of viral URI as the natural course.  Follow diarrhea diet and monitor for any worrisome symptoms          Follow-ups after your visit        Follow-up notes from your care team     Return if symptoms worsen or fail to improve.      Who to contact     If you have questions or need follow up information about today's clinic visit or your schedule please contact [facility] directly at [phone].  Normal or non-critical lab and imaging results will be communicated to you by MyChart, letter or phone within 4 business days after the clinic has received the results. If you do not hear from us within 7 days, please contact the clinic through MyChart or phone. If you have a critical or abnormal lab result, we will notify you by phone as soon as possible.  Submit refill requests through [employer] or call your pharmacy and they will forward the refill request to us. Please allow 3 business days for your refill to  "be completed.          Additional Information About Your Visit        [employer] FESTIVAL Information     [employer] gives you secure access to your electronic health record. If you see a primary care provider, you can also send messages to your care team and make appointments. If you have questions, please call your primary care clinic.  If you do not have a primary care provider, please call [phone] and they will assist you.        Care EveryWhere ID     This is your Care EveryWhere ID. This could be used by other organizations to access your Left [facility] medical records  [id number]        Your Vitals Were     Pulse Temperature Respirations Height Pulse Oximetry BMI (Body Mass Index)    88 98.3  F (36.8  C) (Oral) 16 1.651 m (5' 5\") 94% 29.05 kg/m2       Blood Pressure from Last 3 Encounters:   11/25/17 104/56   11/05/17 104/68   11/01/17 104/76    Weight from Last 3 Encounters:   11/25/17 79.2 kg (174 lb 9.6 oz)   11/05/17 80.3 kg (177 lb)   11/01/17 80.4 kg (177 lb 3.2 oz)              Today, you had the following     No orders found for display         Today's Medication Changes          These changes are accurate as of: 11/25/17 10:57 AM.  If you have any questions, ask your nurse or doctor.               Start taking these medicines.        Dose/Directions    montelukast 10 MG tablet   Commonly known as:  SINGULAIR   Used for:  Acute bronchitis with symptoms > 10 days   Started by:  Janay Adler MD        Dose:  10 mg   Take 1 tablet (10 mg) by mouth At Bedtime   Quantity:  30 tablet   Refills:  0         These medicines have changed or have updated prescriptions.        Dose/Directions    * azithromycin 250 MG tablet   Commonly known as:  ZITHROMAX   This may have changed:  Another medication with the same name was added. Make sure you understand how and when to take each.   Used for:  Cough   Changed by:  Alejo Clark MD        Two tablets first day, then one tablet daily for four days.   Quantity:  6 tablet "   Refills:  0       * azithromycin 250 MG tablet   Commonly known as:  ZITHROMAX   This may have changed:  You were already taking a medication with the same name, and this prescription was added. Make sure you understand how and when to take each.   Used for:  Acute bronchitis with symptoms > 10 days   Changed by:  Janay Adler MD        Two tablets first day, then one tablet daily for four days.   Quantity:  6 tablet   Refills:  0       * Notice:  This list has 2 medication(s) that are the same as other medications prescribed for you. Read the directions carefully, and ask your doctor or other care provider to review them with you.         Where to get your medicines      These medications were sent to [facility]/pharmacy #[id number] - [place], [place] - [address]  [address]  [address], [facility] [address]     Phone:  [phone]     azithromycin 250 MG tablet    montelukast 10 MG tablet                Primary Care Provider Office Phone # Fax #    Jose Vicente [phone] [address] [facility] [address]  [facility] [address]        Equal Access to Services     [facility]: Hadii aad ku hadasho Soomaali, waaxda luqadaha, qaybta kaalmada adeegyada, waxay idiin haycody angel . So [facility] [phone].    ATENCIÓN: Si habla español, tiene a kramer disposición servicios gratuitos de asistencia lingüística. Llame al [phone].    We comply with applicable federal civil rights laws and [place] laws. We do not discriminate on the basis of race, color, national origin, age, disability, sex, sexual orientation, or gender identity.            Thank you!     Thank you for choosing [facility]  for your care. Our goal is always to provide you with excellent care. Hearing back from our patients is one way we can continue to improve our services. Please take a few minutes to complete the written survey that you may receive in the mail after your visit with us.  Thank you!             Your Updated Medication List - Protect others around you: Learn how to safely use, store and throw away your medicines at www.disposemymeds.org.          This list is accurate as of: 11/25/17 10:57 AM.  Always use your most recent med list.                   Brand Name Dispense Instructions for use Diagnosis    acetaminophen-codeine 300-30 MG per tablet    TYLENOL #3    10 tablet    Take 1 tablet by mouth every 6 hours as needed for pain maximum 3 tablet(s) per day    Cough       * azithromycin 250 MG tablet    ZITHROMAX    6 tablet    Two tablets first day, then one tablet daily for four days.    Cough       * azithromycin 250 MG tablet    ZITHROMAX    6 tablet    Two tablets first day, then one tablet daily for four days.    Acute bronchitis with symptoms > 10 days       ELIDEL 1 % cream   Generic drug:  pimecrolimus           guaiFENesin-codeine 100-10 MG/5ML Soln solution    ROBITUSSIN AC    120 mL    Take 5 mLs by mouth every 4 hours as needed for cough    Cough       montelukast 10 MG tablet    SINGULAIR    30 tablet    Take 1 tablet (10 mg) by mouth At Bedtime    Acute bronchitis with symptoms > 10 days       order for DME     1 Device    Equipment being ordered: orthotics for both feet, custom    TAR syndrome, Foot deformity, congenital       PRILOSEC PO      Take 20 mg by mouth every morning        triamcinolone 0.1 % cream    KENALOG          * Notice:  This list has 2 medication(s) that are the same as other medications prescribed for you. Read the directions carefully, and ask your doctor or other care provider to review them with you.

## 2017-12-01 ENCOUNTER — TELEPHONE (OUTPATIENT)
Dept: FAMILY MEDICINE | Facility: CLINIC | Age: 37
End: 2017-12-01

## 2017-12-01 NOTE — TELEPHONE ENCOUNTER
The 5 day antibiotic is long acting and will stay in his system for 10 days ( 4 more days)  I would continue to monitor his cough as the both new medication therapy is on board ( the night time Singulair is also working on calming down his inflammation.)    If symptoms get worse like shortness of breath, fever, wheezing , etc. Make a follow up appointment.

## 2017-12-01 NOTE — TELEPHONE ENCOUNTER
Caesar saw you last week and was given a z-marge and is taking his Singulair daily.  He is calling as he is coughing and the cough is a hacking cough non-productive, with clear sputum when it does come up.  He coughed so hard last night, he vomited.  He was told by friends that this can last a long time, so is wondering if there is something he can take, or does he just ride it out?- please advise    Caesar's phone #[phone]

## 2018-07-24 ENCOUNTER — HOSPITAL ENCOUNTER (EMERGENCY)
Facility: CLINIC | Age: 38
Discharge: HOME | End: 2018-07-24
Attending: EMERGENCY MEDICINE | Admitting: EMERGENCY MEDICINE
Payer: MEDICAID

## 2018-07-24 ENCOUNTER — APPOINTMENT (OUTPATIENT)
Dept: CT IMAGING | Facility: CLINIC | Age: 38
End: 2018-07-24
Attending: EMERGENCY MEDICINE
Payer: MEDICAID

## 2018-07-24 VITALS
RESPIRATION RATE: 14 BRPM | OXYGEN SATURATION: 98 % | SYSTOLIC BLOOD PRESSURE: 119 MMHG | TEMPERATURE: 98.4 F | DIASTOLIC BLOOD PRESSURE: 79 MMHG

## 2018-07-24 DIAGNOSIS — S30.0XXA CONTUSION OF BUTTOCK, INITIAL ENCOUNTER: ICD-10-CM

## 2018-07-24 DIAGNOSIS — S00.83XA CONTUSION OF FOREHEAD, INITIAL ENCOUNTER: ICD-10-CM

## 2018-07-24 DIAGNOSIS — S01.112A LACERATION OF LEFT EYEBROW WITHOUT COMPLICATION, INITIAL ENCOUNTER: ICD-10-CM

## 2018-07-24 PROCEDURE — 25000125 ZZHC RX 250

## 2018-07-24 PROCEDURE — 12002 RPR S/N/AX/GEN/TRNK2.6-7.5CM: CPT

## 2018-07-24 PROCEDURE — 99283 EMERGENCY DEPT VISIT LOW MDM: CPT | Mod: 25

## 2018-07-24 PROCEDURE — 70450 CT HEAD/BRAIN W/O DYE: CPT

## 2018-07-24 RX ORDER — OXYCODONE HYDROCHLORIDE 5 MG/1
5 TABLET ORAL ONCE
Status: DISCONTINUED | OUTPATIENT
Start: 2018-07-24 | End: 2018-07-24 | Stop reason: HOSPADM

## 2018-07-24 RX ADMIN — Medication 5 ML: at 05:47

## 2018-07-24 ASSESSMENT — ENCOUNTER SYMPTOMS
HEADACHES: 1
WOUND: 1
DIARRHEA: 1

## 2018-07-24 NOTE — ED AVS SNAPSHOT
[facility] Emergency Department    [address]    [facility] [address]    Phone:  [phone]    Fax:  [phone]                                       Caesar Hernandez   MRN: [id number]    Department:  [facility] Emergency Department   Date of Visit:  7/24/2018           After Visit Summary Signature Page     I have received my discharge instructions, and my questions have been answered. I have discussed any challenges I see with this plan with the nurse or doctor.    ..........................................................................................................................................  Patient/Patient Representative Signature      ..........................................................................................................................................  Patient Representative Print Name and Relationship to Patient    ..................................................               ................................................  Date                                            Time    ..........................................................................................................................................  Reviewed by Signature/Title    ...................................................              ..............................................  Date                                                            Time

## 2018-07-24 NOTE — ED PROVIDER NOTES
History     Chief Complaint:  Fall    HPI   Caesar Hernandez is a 37 year old male with a history of TAR syndrome, GERD and a coagulation disorder who presents after a fall. The patient reports he slipped in the shower and hit his head. He states he sustained a laceration above his left eyebrow and abrasions to his bottom. He reports that he has head pain where he hit his head. He denies losing consciousness during the fall. He also notes that he had loose stools this morning and feels another one coming on. Tetanus is up to date    Allergies:  Aspirin  NSAIDs     Medications:    Robitussin  Singulair  Prilosec  Kenalog    Past Medical History:    Coagulation disorder  Exercise-induced asthma  Gastro-esophageal reflux disease  Heart murmur  Other chronic pain  Vitiligo  Foot deformity, congenital  TAR syndrome: thrombocytopenia and absent radius    Past Surgical History:    Extractions dental, bilateral  Hernia repair  Testicle surgery, undescended on right    Family History:    History reviewed. No pertinent family history.     Social History:  Marital Status:   [2]  Smoking status: Never smoker  Alcohol use: Yes    Review of Systems   Gastrointestinal: Positive for diarrhea.   Skin: Positive for wound (laceration above left eyebrow, abrasions on buttocks).   Neurological: Positive for headaches. Negative for syncope.   All other systems reviewed and are negative.    Physical Exam     Patient Vitals for the past 24 hrs:   BP Temp Temp src Heart Rate Resp SpO2   07/24/18 0551 119/79 - - 79 16 100 %   07/24/18 0528 - 98.4  F (36.9  C) Oral - 18 -       Physical Exam   GEN- alert, cooperative  HEENT- PERRL, EOMI, MMM, oral pharynx without abnormalities, no dental injuries, midface stable, TM's clear bilaterally. 3cm vertical upper left eyebrow laceration, no foreign body visualized  NECK- ROM, soft, supple, no midline C spine tenderness to palpation, no abrasions  RESP- CTAB, no w/r/r, chest wall nontender, no  crepitus, symmetrical chest wall movement  CV- RRR, no m/r/g  ABD- soft, NT/ND, +BS  MSK- normal ROM in all extremities, pelvis stable to AP and lateral compression, no T and L spinal tenderness in the midline, 5/5 strength in all extremities, congenital upper extremity deformities noted  NEURO- GCS 15, speech normal, alert, sensation to light touch intact in all extremities,  strong bilaterally, normal gait  SKIN- no rash, no bruising, no ecchymosis, no abrasion  PSYCH- normal mood, normal behavior, normal thought process        Emergency Department Course   Imaging:  Radiographic findings were communicated with the patient who voiced understanding of the findings.    CT Head w/o Contrast:  No evidence of acute intracranial abnormality.  As read by Radiology.    Procedures:  .    Narrative: Procedure: Laceration Repair        LACERATION:  A simple clean 3 cm laceration.      LOCATION:  forehead      FUNCTION:  Distally sensation and circulation are intact.      ANESTHESIA:  LET - Topical      PREPARATION:  Irrigation and Scrubbing with Shur Clens      DEBRIDEMENT:  no debridement      CLOSURE:  Wound was closed with One Layer.  Skin closed with 6 x 5.0 Prolene using interrupted sutures.      Emergency Department Course:  Past medical records, nursing notes, and vitals reviewed.  0529: I performed an exam of the patient and obtained history, as documented above.   The patient was sent for a head CT while in the emergency department, findings above.    0624: I rechecked the patient. Explained findings to the patient.    0636: I performed a laceration repair, notes above.    Findings and plan explained to the patient. Patient discharged home with instructions regarding supportive care, medications, and reasons to return. The importance of close follow-up was reviewed.     Impression & Plan    Medical Decision Making:  Caesar Hernandez is a 37 year old male who presents after a fall in the shower. He was awake and had  a lost consciousness, however he does a platelet disorder where he has low platelets anywhere from 35 to 75 at his baseline due to thrombocytopenia. CT was obtained which was negative. His wound was sutured, as described above. He was instructed to have the sutures on for 7 days. He continued to ice to decrease swelling and we did advise him to recheck with his doctor and to return to the ED if symptoms worsen such as severe headache, vomiting, vision changes or any other concerns. ED diagnosis: contusion of forehead, laceration and hip contusion        Diagnosis:    ICD-10-CM   1. Contusion of forehead, initial encounter S00.83XA   2. Laceration of left eyebrow without complication, initial encounter S01.112A   3. Contusion of buttock, initial encounter S30.0XXA       Disposition:  discharged to home    Colette Whalen  7/24/2018   [facility] EMERGENCY DEPARTMENT  Colette HA, am serving as a scribe at 5:29 AM on 7/24/2018 to document services personally performed by Chris Castañeda MD based on my observations and the provider's statements to me.        Chris Castañeda MD  07/26/18 0806

## 2018-07-24 NOTE — ED AVS SNAPSHOT
[facility] Emergency Department    [address]    [facility] [address]    Phone:  [phone]    Fax:  [phone]                                       Caesar Hernandez   MRN: [id number]    Department:  [facility] Emergency Department   Date of Visit:  7/24/2018           Patient Information     Date Of Birth          1980        Your diagnoses for this visit were:     Contusion of forehead, initial encounter     Laceration of left eyebrow without complication, initial encounter     Contusion of buttock, initial encounter        You were seen by Chris Castañeda MD.      Follow-up Information     Follow up with Jose Vicente MD. Go in 1 week.    Specialty:  Family Practice    Why:  For suture removal    Contact information:    [address]  [facility] [address]  [phone]          Discharge Instructions         Facial Contusion  A contusion is another word for a bruise. It happens when small blood vessels break open and leak blood into the nearby area. A facial contusion can result from a bump, hit, or fall. This may happen during sports or an accident. Symptoms of a contusion often include changes in skin color (bruising), swelling, and pain.   The swelling from the contusion should decrease in a few days. Bruising and pain may take several weeks to go away.   Home care    If you have been prescribed medicines for pain, take them as directed.    To help reduce swelling and pain, wrap a cold pack or bag of frozen peas in a thin towel. Put it on the injured area for up to 20 minutes. Do this a few times a day until the swelling goes down.     If you have scrapes or cuts on your face requiring stiches or other closures, care for them as directed.    For the next 24 hours (or longer if instructed):  ? Don t drink alcohol, or use sedatives or medicines that make you sleepy.  ? Don t drive or operate machinery.  ? Don't do anything strenuous. Don t  "lift or strain.  ? Don't return to sports or other activity that could result in another head injury.  Note about concussions  Because the injury was to your head, it is possible that a concussion (mild brain injury) could result. Symptoms of a concussion can show up later. Be alert for signs and symptoms of a concussion. Seek emergency medical care if any of these develop over the next hours to days:    Headache    Nausea or vomiting    Dizziness    Sensitivity to light or noise    Unusual sleepiness or grogginess    Trouble falling asleep    Personality changes    Vision changes    Memory loss    Confusion    Trouble walking or clumsiness    Loss of consciousness (even for a short time)    Inability to be awakened    Feeling \"off\" or slow as if in a daze   Follow-up care  Follow up with your healthcare provider, or as directed.  When to seek medical advice  Call your healthcare provider right away if any of these occur:    Swelling or pain that gets worse, not better    New swelling or pain    Warmth or drainage from the swollen area or from cuts or scrapes    Fluid drainage or bleeding from the nose or ears    Fever of 100.4 F (38 C) or higher, or as directed by your healthcare provider  Call 911  Call 911 if any of the following occur:     Repeated vomiting    Unusual drowsiness or trouble awakening    Fainting or loss of consciousness    Seizure    Worsening confusion, memory loss, dizziness, headache, behavior, speech, or vision  Date Last Reviewed: 5/1/2017 2000-2017 [employer]. [address] [place] [address]. All rights reserved. This information is not intended as a substitute for professional medical care. Always follow your healthcare professional's instructions.        Face Laceration: Stitches or Tape  A laceration is a cut through the skin. This will require stitches if it is deep. Minor cuts may be treated with surgical tape.    Home care    Your healthcare provider may " prescribe an antibiotic. This is to help prevent infection. Follow all instructions for taking this medicine. Take the medicine every day until it is gone or you are told to stop. You should not have any left over.    The healthcare provider may prescribe medicines for pain. Follow instructions for taking them.    Follow the healthcare provider s instructions on how to care for the cut.    Wash your hands with soap and warm water before and after caring for the cut. This helps prevent infection.    If a bandage was applied and it becomes wet or dirty, replace it. Otherwise, leave it in place for the first 24 hours, then change it once a day or as directed.    If stitches were used, clean the wound daily:  ? After removing the bandage, wash the area with soap and water. Use a wet cotton swab to loosen and remove any blood or crust that forms.  ? After cleaning, keep the wound clean and dry. Talk with your healthcare provider before putting any antibiotic ointment on the wound. Reapply a fresh bandage.  ? You may remove the bandage to shower as usual after the first 24 hours, but don't soak the area in water (no swimming) until the sutures are removed.    If surgical tape was used, keep the area clean and dry. If it becomes wet, blot it dry with a towel.    Most facial skin wounds heal without problems. But an infection sometimes occurs despite proper treatment. Watch for the signs of infection listed below.  Follow-up care  Follow up with your healthcare provider as advised. Be sure to return for removal of the stitches as directed. Ask your provider how long stitches should remain in place. If surgical tape closures were used, you may remove them yourself when your provider recommends if they have not fallen off on their own.  When to seek medical advice  Call your healthcare provider right away if any of these occur:    Wound bleeding not controlled by direct pressure    Signs of infection, including increasing  pain in the wound, increasing wound redness or swelling, or pus or bad odor coming from the wound    Fever of 100.4 F (38 C) or higher, or as directed by your healthcare provider    Stitches come apart or fall out or surgical tape falls off before 5 days    Wound edges reopen    Wound changes colors    Numbness around the wound   Date Last Reviewed: 7/1/2017 2000-2017 [employer]. [address] [place] [address], [place] [address]. All rights reserved. This information is not intended as a substitute for professional medical care. Always follow your healthcare professional's instructions.       * HEAD INJURY, no wake-up (Adult)    You have had a head injury. It does not appear serious at this time. Sometimes symptoms of a more serious problem (concussion, bruising or bleeding in the brain) may appear later. Therefore, watch for the warning signs listed below.  HOME CARE:      During the next 24 hours someone must stay with you to check for the signs below. It is not necessary to stay awake or be awakened during the night.    If you have swelling of the face or scalp, apply an ice pack (ice cubes in a plastic bag, wrapped in a towel) for 20 minutes. Do this every 1-2 hours until the swelling starts to go down.    You may use acetaminophen (Tylenol) 650-1000 mg every 6 hours or ibuprofen (Motrin, Advil) 600 mg every 6-8 hours with food to control pain, if you are able to take these medicines. [NOTE: If you have chronic liver or kidney disease or ever had a stomach ulcer or GI bleeding, talk with your doctor before using these medicines.] Do not take aspirin after a head injury.    For the next 24 hours:  ? Do not take alcohol, sedatives or medicines that make you sleepy.  ? Do not drive or operate machinery.  ? Avoid strenuous activities. No lifting or straining.    If you have had any symptoms of a concussion today (nausea, vomiting, dizziness, confusion, headache, memory loss or if you were knocked out), do  not return to sports or any activity that could result in another head injury until 2 full weeks after all symptoms are gone and you have been cleared by your doctor. A second head injury before fully recovering from the first one can lead to serious brain injury.  FOLLOW UP with your doctor if symptoms are not improving after 24 hours, or as directed.  GET PROMPT MEDICAL ATTENTION if any of the following warning signs occur:    Repeated vomiting    Severe or worsening headache or dizziness    Unusual drowsiness, or unable to awaken as usual    Confusion or change in behavior or speech, memory loss, blurred vision    Convulsion (seizure)    Increasing scalp or face swelling    Redness, warmth or pus from the swollen area    Fluid drainage or bleeding from the nose or ears    2061-6127 [employer]. [address] [place] [address], [place] [id number]. All rights reserved. This information is not intended as a substitute for professional medical care. Always follow your healthcare professional's instructions.  This information has been modified by your health care provider with permission from the publisher.        24 Hour Appointment Hotline       To make an appointment at any [facility], call [phone] ([phone]). If you don't have a family doctor or clinic, we will help you find one. [facility] are conveniently located to serve the needs of you and your family.             Review of your medicines      Our records show that you are taking the medicines listed below. If these are incorrect, please call your family doctor or clinic.        Dose / Directions Last dose taken    acetaminophen-codeine 300-30 MG per tablet   Commonly known as:  TYLENOL #3   Dose:  1 tablet   Quantity:  10 tablet        Take 1 tablet by mouth every 6 hours as needed for pain maximum 3 tablet(s) per day   Refills:  0        * azithromycin 250 MG tablet   Commonly known as:  ZITHROMAX   Quantity:  6 tablet        Two  tablets first day, then one tablet daily for four days.   Refills:  0        * azithromycin 250 MG tablet   Commonly known as:  ZITHROMAX   Quantity:  6 tablet        Two tablets first day, then one tablet daily for four days.   Refills:  0        ELIDEL 1 % cream   Generic drug:  pimecrolimus        Refills:  3        guaiFENesin-codeine 100-10 MG/5ML Soln solution   Commonly known as:  ROBITUSSIN AC   Dose:  1 tsp.   Quantity:  120 mL        Take 5 mLs by mouth every 4 hours as needed for cough   Refills:  0        montelukast 10 MG tablet   Commonly known as:  SINGULAIR   Dose:  10 mg   Quantity:  30 tablet        Take 1 tablet (10 mg) by mouth At Bedtime   Refills:  0        order for DME   Quantity:  1 Device        Equipment being ordered: orthotics for both feet, custom   Refills:  0        PRILOSEC PO   Dose:  20 mg        Take 20 mg by mouth every morning   Refills:  0        triamcinolone 0.1 % cream   Commonly known as:  KENALOG        Refills:  2        * Notice:  This list has 2 medication(s) that are the same as other medications prescribed for you. Read the directions carefully, and ask your doctor or other care provider to review them with you.            Procedures and tests performed during your visit     CT Head w/o Contrast      Orders Needing Specimen Collection     None      Pending Results     No orders found from 7/22/2018 to 7/25/2018.            Pending Culture Results     No orders found from 7/22/2018 to 7/25/2018.            Pending Results Instructions     If you had any lab results that were not finalized at the time of your Discharge, you can call the ED Lab Result RN at [phone]. You will be contacted by this team for any positive Lab results or changes in treatment. The nurses are available 7 days a week from 10A to 6:30P.  You can leave a message 24 hours per day and they will return your call.        Test Results From Your Hospital Stay        7/24/2018  6:24 AM      Narrative      CT HEAD WITHOUT CONTRAST  7/24/2018 6:08 AM     HISTORY: Fall. Low platelets.    COMPARISON: None.    TECHNIQUE: Without intravenous contrast, helical sections were  acquired through the brain. Coronal reconstructions were generated.  Radiation dose for this scan was reduced using automated exposure  control, adjustment of the mA and/or kV according to the patient's  size, or iterative reconstruction technique.    FINDINGS: No intra-axial mass, mass effect or midline shift. Normal  gray-white matter differentiation. No visualized acute intra-axial  hemorrhage. The cerebral ventricles are normal in caliber. The basal  cisterns are patent. No extra-axial fluid collection. The visualized  portions of the paranasal sinuses and mastoid air cells are  unremarkable.        Impression     IMPRESSION: No evidence of acute intracranial abnormality.    JAVY EVANS MD                Clinical Quality Measure: Blood Pressure Screening     Your blood pressure was checked while you were in the emergency department today. The last reading we obtained was  BP: 119/79 . Please read the guidelines below about what these numbers mean and what you should do about them.  If your systolic blood pressure (the top number) is less than 120 and your diastolic blood pressure (the bottom number) is less than 80, then your blood pressure is normal. There is nothing more that you need to do about it.  If your systolic blood pressure (the top number) is 120-139 or your diastolic blood pressure (the bottom number) is 80-89, your blood pressure may be higher than it should be. You should have your blood pressure rechecked within a year by a primary care provider.  If your systolic blood pressure (the top number) is 140 or greater or your diastolic blood pressure (the bottom number) is 90 or greater, you may have high blood pressure. High blood pressure is treatable, but if left untreated over time it can put you at risk for heart attack,  stroke, or kidney failure. You should have your blood pressure rechecked by a primary care provider within the next 4 weeks.  If your provider in the emergency department today gave you specific instructions to follow-up with your doctor or provider even sooner than that, you should follow that instruction and not wait for up to 4 weeks for your follow-up visit.        Thank you for choosing [facility]       Thank you for choosing [facility] for your care. Our goal is always to provide you with excellent care. Hearing back from our patients is one way we can continue to improve our services. Please take a few minutes to complete the written survey that you may receive in the mail after you visit with us. Thank you!        AIFOTEChart Information     [employer] gives you secure access to your electronic health record. If you see a primary care provider, you can also send messages to your care team and make appointments. If you have questions, please call your primary care clinic.  If you do not have a primary care provider, please call [phone] and they will assist you.        Care EveryWhere ID     This is your Care EveryWhere ID. This could be used by other organizations to access your [facility] medical records  [id number]        Equal Access to Services     DONNIE GAITAN : Mikey Muñoz, jessica howell, inge nguyen. So [facility] [phone].    ATENCIÓN: Si habla español, tiene a kramer disposición servicios gratuitos de asistencia lingüística. Aileen al [phone].    We comply with applicable federal civil rights laws and [place] laws. We do not discriminate on the basis of race, color, national origin, age, disability, sex, sexual orientation, or gender identity.            After Visit Summary       This is your record. Keep this with you and show to your community pharmacist(s) and doctor(s) at your next visit.

## 2018-07-24 NOTE — DISCHARGE INSTRUCTIONS
"  Facial Contusion  A contusion is another word for a bruise. It happens when small blood vessels break open and leak blood into the nearby area. A facial contusion can result from a bump, hit, or fall. This may happen during sports or an accident. Symptoms of a contusion often include changes in skin color (bruising), swelling, and pain.   The swelling from the contusion should decrease in a few days. Bruising and pain may take several weeks to go away.   Home care    If you have been prescribed medicines for pain, take them as directed.    To help reduce swelling and pain, wrap a cold pack or bag of frozen peas in a thin towel. Put it on the injured area for up to 20 minutes. Do this a few times a day until the swelling goes down.     If you have scrapes or cuts on your face requiring stiches or other closures, care for them as directed.    For the next 24 hours (or longer if instructed):  ? Don t drink alcohol, or use sedatives or medicines that make you sleepy.  ? Don t drive or operate machinery.  ? Don't do anything strenuous. Don t lift or strain.  ? Don't return to sports or other activity that could result in another head injury.  Note about concussions  Because the injury was to your head, it is possible that a concussion (mild brain injury) could result. Symptoms of a concussion can show up later. Be alert for signs and symptoms of a concussion. Seek emergency medical care if any of these develop over the next hours to days:    Headache    Nausea or vomiting    Dizziness    Sensitivity to light or noise    Unusual sleepiness or grogginess    Trouble falling asleep    Personality changes    Vision changes    Memory loss    Confusion    Trouble walking or clumsiness    Loss of consciousness (even for a short time)    Inability to be awakened    Feeling \"off\" or slow as if in a daze   Follow-up care  Follow up with your healthcare provider, or as directed.  When to seek medical advice  Call your healthcare " provider right away if any of these occur:    Swelling or pain that gets worse, not better    New swelling or pain    Warmth or drainage from the swollen area or from cuts or scrapes    Fluid drainage or bleeding from the nose or ears    Fever of 100.4 F (38 C) or higher, or as directed by your healthcare provider  Call 911  Call 911 if any of the following occur:     Repeated vomiting    Unusual drowsiness or trouble awakening    Fainting or loss of consciousness    Seizure    Worsening confusion, memory loss, dizziness, headache, behavior, speech, or vision  Date Last Reviewed: 5/1/2017 2000-2017 [employer]. [address] [place] [address]. All rights reserved. This information is not intended as a substitute for professional medical care. Always follow your healthcare professional's instructions.        Face Laceration: Stitches or Tape  A laceration is a cut through the skin. This will require stitches if it is deep. Minor cuts may be treated with surgical tape.    Home care    Your healthcare provider may prescribe an antibiotic. This is to help prevent infection. Follow all instructions for taking this medicine. Take the medicine every day until it is gone or you are told to stop. You should not have any left over.    The healthcare provider may prescribe medicines for pain. Follow instructions for taking them.    Follow the healthcare provider s instructions on how to care for the cut.    Wash your hands with soap and warm water before and after caring for the cut. This helps prevent infection.    If a bandage was applied and it becomes wet or dirty, replace it. Otherwise, leave it in place for the first 24 hours, then change it once a day or as directed.    If stitches were used, clean the wound daily:  ? After removing the bandage, wash the area with soap and water. Use a wet cotton swab to loosen and remove any blood or crust that forms.  ? After cleaning, keep the wound clean  and dry. Talk with your healthcare provider before putting any antibiotic ointment on the wound. Reapply a fresh bandage.  ? You may remove the bandage to shower as usual after the first 24 hours, but don't soak the area in water (no swimming) until the sutures are removed.    If surgical tape was used, keep the area clean and dry. If it becomes wet, blot it dry with a towel.    Most facial skin wounds heal without problems. But an infection sometimes occurs despite proper treatment. Watch for the signs of infection listed below.  Follow-up care  Follow up with your healthcare provider as advised. Be sure to return for removal of the stitches as directed. Ask your provider how long stitches should remain in place. If surgical tape closures were used, you may remove them yourself when your provider recommends if they have not fallen off on their own.  When to seek medical advice  Call your healthcare provider right away if any of these occur:    Wound bleeding not controlled by direct pressure    Signs of infection, including increasing pain in the wound, increasing wound redness or swelling, or pus or bad odor coming from the wound    Fever of 100.4 F (38 C) or higher, or as directed by your healthcare provider    Stitches come apart or fall out or surgical tape falls off before 5 days    Wound edges reopen    Wound changes colors    Numbness around the wound   Date Last Reviewed: 7/1/2017 2000-2017 The "[employer] [address] [place] [address]. All rights reserved. This information is not intended as a substitute for professional medical care. Always follow your healthcare professional's instructions.       * HEAD INJURY, no wake-up (Adult)    You have had a head injury. It does not appear serious at this time. Sometimes symptoms of a more serious problem (concussion, bruising or bleeding in the brain) may appear later. Therefore, watch for the warning signs listed below.  HOME  CARE:      During the next 24 hours someone must stay with you to check for the signs below. It is not necessary to stay awake or be awakened during the night.    If you have swelling of the face or scalp, apply an ice pack (ice cubes in a plastic bag, wrapped in a towel) for 20 minutes. Do this every 1-2 hours until the swelling starts to go down.    You may use acetaminophen (Tylenol) 650-1000 mg every 6 hours or ibuprofen (Motrin, Advil) 600 mg every 6-8 hours with food to control pain, if you are able to take these medicines. [NOTE: If you have chronic liver or kidney disease or ever had a stomach ulcer or GI bleeding, talk with your doctor before using these medicines.] Do not take aspirin after a head injury.    For the next 24 hours:  ? Do not take alcohol, sedatives or medicines that make you sleepy.  ? Do not drive or operate machinery.  ? Avoid strenuous activities. No lifting or straining.    If you have had any symptoms of a concussion today (nausea, vomiting, dizziness, confusion, headache, memory loss or if you were knocked out), do not return to sports or any activity that could result in another head injury until 2 full weeks after all symptoms are gone and you have been cleared by your doctor. A second head injury before fully recovering from the first one can lead to serious brain injury.  FOLLOW UP with your doctor if symptoms are not improving after 24 hours, or as directed.  GET PROMPT MEDICAL ATTENTION if any of the following warning signs occur:    Repeated vomiting    Severe or worsening headache or dizziness    Unusual drowsiness, or unable to awaken as usual    Confusion or change in behavior or speech, memory loss, blurred vision    Convulsion (seizure)    Increasing scalp or face swelling    Redness, warmth or pus from the swollen area    Fluid drainage or bleeding from the nose or ears    8460-3618 [employer]. [address] [place] [address], [place] [address]. All rights reserved.  This information is not intended as a substitute for professional medical care. Always follow your healthcare professional's instructions.  This information has been modified by your health care provider with permission from the publisher.

## 2018-08-02 ENCOUNTER — OFFICE VISIT (OUTPATIENT)
Dept: FAMILY MEDICINE | Facility: CLINIC | Age: 38
End: 2018-08-02
Payer: MEDICAID

## 2018-08-02 VITALS
OXYGEN SATURATION: 96 % | SYSTOLIC BLOOD PRESSURE: 118 MMHG | RESPIRATION RATE: 20 BRPM | HEART RATE: 80 BPM | TEMPERATURE: 98.5 F | BODY MASS INDEX: 30.34 KG/M2 | DIASTOLIC BLOOD PRESSURE: 80 MMHG | HEIGHT: 65 IN | WEIGHT: 182.1 LBS

## 2018-08-02 DIAGNOSIS — Q87.2 TAR SYNDROME (H): Chronic | ICD-10-CM

## 2018-08-02 DIAGNOSIS — D69.42 TAR SYNDROME (H): Chronic | ICD-10-CM

## 2018-08-02 DIAGNOSIS — J35.8 TONSIL STONE: ICD-10-CM

## 2018-08-02 DIAGNOSIS — Z48.02 ENCOUNTER FOR REMOVAL OF SUTURES: Primary | ICD-10-CM

## 2018-08-02 PROCEDURE — 99213 OFFICE O/P EST LOW 20 MIN: CPT | Performed by: FAMILY MEDICINE

## 2018-08-02 NOTE — MR AVS SNAPSHOT
"              After Visit Summary   8/2/2018    Caesar Hernandez    MRN: [id number]           Patient Information     Date Of Birth          1980        Visit Information        Provider Department      8/2/2018 1:20 PM Marie Sky MD [facility]        Today's Diagnoses     Encounter for removal of sutures    -  1    TAR syndrome: thrombocytopenia and absent radius        Tonsil stone           Follow-ups after your visit        Who to contact     If you have questions or need follow up information about today's clinic visit or your schedule please contact [facility] directly at [phone].  Normal or non-critical lab and imaging results will be communicated to you by LiveRSVPhart, letter or phone within 4 business days after the clinic has received the results. If you do not hear from us within 7 days, please contact the clinic through LiveRSVPhart or phone. If you have a critical or abnormal lab result, we will notify you by phone as soon as possible.  Submit refill requests through Easy Vino or call your pharmacy and they will forward the refill request to us. Please allow 3 business days for your refill to be completed.          Additional Information About Your Visit        MyChart Information     Easy Vino gives you secure access to your electronic health record. If you see a primary care provider, you can also send messages to your care team and make appointments. If you have questions, please call your primary care clinic.  If you do not have a primary care provider, please call [phone] and they will assist you.        Care EveryWhere ID     This is your Care EveryWhere ID. This could be used by other organizations to access your [place] medical records  [id number]        Your Vitals Were     Pulse Temperature Respirations Height Pulse Oximetry BMI (Body Mass Index)    80 98.5  F (36.9  C) (Oral) 20 5' 5.25\" (1.657 m) 96% 30.07 kg/m2       Blood Pressure from Last 3 " Encounters:   08/02/18 118/80   07/24/18 119/79   11/25/17 104/56    Weight from Last 3 Encounters:   08/02/18 182 lb 1.6 oz (82.6 kg)   11/25/17 174 lb 9.6 oz (79.2 kg)   11/05/17 177 lb (80.3 kg)              Today, you had the following     No orders found for display       Primary Care Provider Office Phone # Fax #    Jose Vicente [phone] [address] [facility] [address]  [facility] [address]        Equal Access to Services     [facility]: Hadii aad ku hadasho Soomaali, waaxda luqadaha, qaybta kaalmada adeegyada, inge angel . So [facility] [phone].    ATENCIÓN: Si habla español, tiene a kramer disposición servicios gratuitos de asistencia lingüística. [facility] [phone].    We comply with applicable federal civil rights laws and [place] laws. We do not discriminate on the basis of race, color, national origin, age, disability, sex, sexual orientation, or gender identity.            Thank you!     Thank you for choosing [facility]  for your care. Our goal is always to provide you with excellent care. Hearing back from our patients is one way we can continue to improve our services. Please take a few minutes to complete the written survey that you may receive in the mail after your visit with us. Thank you!             Your Updated Medication List - Protect others around you: Learn how to safely use, store and throw away your medicines at www.disposemymeds.org.          This list is accurate as of 8/2/18  4:06 PM.  Always use your most recent med list.                   Brand Name Dispense Instructions for use Diagnosis    ELIDEL 1 % cream   Generic drug:  pimecrolimus           montelukast 10 MG tablet    SINGULAIR    30 tablet    Take 1 tablet (10 mg) by mouth At Bedtime    Acute bronchitis with symptoms > 10 days       order for DME     1 Device    Equipment being ordered: orthotics for both feet, custom    TAR syndrome, Foot  deformity, congenital       PRILOSEC PO      Take 20 mg by mouth every morning        triamcinolone 0.1 % cream    KENALOG

## 2018-08-02 NOTE — PROGRESS NOTES
SUBJECTIVE:   Caesar Hernandez is a 37 year old male who presents to clinic today for the following health issues:    ED/UC Followup:    Facility:  [facility]  Date of visit: 07/24/18  Reason for visit: FALL  Current Status: needs sutures removed by left eyebrow      Patient slipped and fell in the shower. Upon falling he hit his head and required stiches near his left eyebrow. Notes that he bruised his bottom as well with the fall. He slipped while he switched washing his legs. Normally he does not fall. Denies any balance issues. Has been taking tylenol pm to help him sleep at night. Denies any dizziness.     Patient wishes to have the stitches removed today. Notes that they are being removed a little early, but he spoke to Dr. Vicente who looked at them and said that they should be fine for removal.     TAR syndrome  Notes that his current PCP does not take MA and he is currently full MA. He had an appointment scheduled today at 12 pm but canceled since they don't take his insurance. Dr. Vicente usually orders routine labs to check platelet count for the TAR syndrome, due 10/2018. Not currently seeing a specialist, just following up with Dr. Vicente.     Throat discomfort  He usually gets tonsil stones, currently has 3. Notes that over the weekend he expelled 3 rather large chunks but he feels as if there is still one present at the back left side. Denies feeling ill or pain when swallowing. He wishes to have it removed if possible.     Problem list and histories reviewed & adjusted, as indicated.  Additional history: as documented    Recent Labs   Lab Test  07/18/17   1034  05/18/15   1055  03/21/14   1004   LDL  141*  111  139*   HDL  56  56  49   TRIG  126  93  96   ALT  126*  62   --    CR  1.08  0.98   --    GFRESTIMATED  77  87   --    GFRESTBLACK  >90   GFR Calc    >90   GFR Calc     --    POTASSIUM  4.4  3.7   --       BP Readings from Last 3 Encounters:   08/02/18 118/80  "  07/24/18 119/79   11/25/17 104/56    Wt Readings from Last 3 Encounters:   08/02/18 82.6 kg (182 lb 1.6 oz)   11/25/17 79.2 kg (174 lb 9.6 oz)   11/05/17 80.3 kg (177 lb)          Labs reviewed in EPIC    Reviewed and updated as needed this visit by clinical staff  Tobacco  Allergies  Meds  Problems  Med Hx  Surg Hx  Fam Hx  Soc Hx        Reviewed and updated as needed this visit by Provider         ROS:  Constitutional, HEENT, cardiovascular, pulmonary, gi and gu systems are negative, except as otherwise noted.    This document serves as a record of the services and decisions personally performed and made by Marie Sky MD. It was created on her behalf by Meche Marrero, a trained medical scribe. The creation of this document is based on the provider's statements to the medical scribe.  Meche Marrero August 2, 2018 1:12 PM     OBJECTIVE:     /80 (BP Location: Right arm, Patient Position: Chair, Cuff Size: Adult Regular)  Pulse 80  Temp 98.5  F (36.9  C) (Oral)  Resp 20  Ht 1.657 m (5' 5.25\")  Wt 82.6 kg (182 lb 1.6 oz)  SpO2 96%  BMI 30.07 kg/m2  Body mass index is 30.07 kg/(m^2).     GENERAL: healthy, alert and no distress  EYES: Eyes grossly normal to inspection  NECK: no adenopathy, no asymmetry, masses, or scars and thyroid normal to palpation. Slightly erythematic oropharynx and large tonsil stone noted on the left side  RESP: lungs clear to auscultation - no rales, rhonchi or wheezes  CV: regular rate and rhythm, normal S1 S2,   MS:congential malformation of forearms noted, lack of radii bilt  SKIN: forehead above left eyebrow with 3cm long well approximated laceration, well healed, sutures removed and no complications noted  NEURO: Normal strength and tone, mentation intact and speech normal  PSYCH: mentation appears normal, affect normal/bright    Diagnostic Test Results:  none     ASSESSMENT/PLAN:   (Z48.02) Encounter for removal of sutures  (primary encounter " diagnosis)  Comment: removed succefully, no complications  Plan:     (Q87.2) TAR syndrome: thrombocytopenia and absent radius  Comment: plans are to check CBC in Oct, per pt and Yung, pt did not want me to pend any future orders at this time  Plan:     Tonsillar stone-attempted to rinse out, unsuccessfully, gave pt a syringe with small tip, to irrigate area at home, recommend gargling as well. Stone is not harmful and tried to reassure          Successfully removed stitches. Unsuccessful at dislodging tonsil stone, patient is going to try doing so at home. Syringe and tongue depressors provided.     FUTURE APPOINTMENTS:       - Follow-up visit if symptoms continue to be bothersome     The information in this document, created by the medical scribe for me, accurately reflects the services I personally performed and the decisions made by me. I have reviewed and approved this document for accuracy prior to leaving the patient care area.  August 2, 2018 1:12 PM    Marie Sky MD  [facility]

## 2019-01-30 ENCOUNTER — TELEPHONE (OUTPATIENT)
Dept: FAMILY MEDICINE | Facility: CLINIC | Age: 39
End: 2019-01-30

## 2019-02-11 ENCOUNTER — TRANSFERRED RECORDS (OUTPATIENT)
Dept: FAMILY MEDICINE | Facility: CLINIC | Age: 39
End: 2019-02-11

## 2019-03-25 ENCOUNTER — OFFICE VISIT (OUTPATIENT)
Dept: SURGERY | Facility: CLINIC | Age: 39
End: 2019-03-25
Payer: COMMERCIAL

## 2019-03-25 ENCOUNTER — OFFICE VISIT (OUTPATIENT)
Dept: INTERNAL MEDICINE | Facility: CLINIC | Age: 39
End: 2019-03-25
Payer: COMMERCIAL

## 2019-03-25 ENCOUNTER — TELEPHONE (OUTPATIENT)
Dept: SURGERY | Facility: CLINIC | Age: 39
End: 2019-03-25

## 2019-03-25 VITALS
HEART RATE: 80 BPM | OXYGEN SATURATION: 98 % | SYSTOLIC BLOOD PRESSURE: 114 MMHG | TEMPERATURE: 98.7 F | WEIGHT: 185.3 LBS | BODY MASS INDEX: 30.87 KG/M2 | HEIGHT: 65 IN | DIASTOLIC BLOOD PRESSURE: 64 MMHG | RESPIRATION RATE: 16 BRPM

## 2019-03-25 VITALS
DIASTOLIC BLOOD PRESSURE: 64 MMHG | SYSTOLIC BLOOD PRESSURE: 114 MMHG | HEIGHT: 65 IN | WEIGHT: 185 LBS | BODY MASS INDEX: 30.82 KG/M2 | HEART RATE: 80 BPM

## 2019-03-25 DIAGNOSIS — D69.6 THROMBOCYTOPENIA (H): ICD-10-CM

## 2019-03-25 DIAGNOSIS — D17.30 LIPOMA OF SKIN AND SUBCUTANEOUS TISSUE: Primary | ICD-10-CM

## 2019-03-25 PROCEDURE — 99243 OFF/OP CNSLTJ NEW/EST LOW 30: CPT | Performed by: SURGERY

## 2019-03-25 PROCEDURE — 99214 OFFICE O/P EST MOD 30 MIN: CPT | Performed by: INTERNAL MEDICINE

## 2019-03-25 ASSESSMENT — MIFFLIN-ST. JEOR
SCORE: 1691.36
SCORE: 1689.99

## 2019-03-25 NOTE — TELEPHONE ENCOUNTER
Type of surgery: Excision back lipoma  Location of surgery: [facility]  Date and time of surgery: 5/10/19 at 10am  Surgeon: Dr. Calos Jones  Pre-Op Appt Date: Patient to schedule  Post-Op Appt Date: Patient to schedule   Packet sent out: Yes  Pre-cert/Authorization completed:  Not Applicable  Date: 3/25/19    
Depression, unspecified depression type

## 2019-03-25 NOTE — PROGRESS NOTES
Dr Bazzi's note      Patient's instructions / PLAN:                                                        Plan:  1. Surgeon referral   [place] [phone]   [email or web address]  [place] [phone]   [email or web address]  [facility]: General Surgery [facility] [phone]   [email or web address]  2. Tylenol max 2000 mg daily  3. Lidocaine patch 4% - use 1-3 patches to the painful area for maximum 12 h a day ( you need to be off patches for 12h at least). You will find these patches over the counter, no prescription is needed.        ASSESSMENT & PLAN:                                                      (D17.30) Lipoma of skin and subcutaneous tissue  (primary encounter diagnosis)  Comment: Risk for bleeding because of low platelets  Plan: GENERAL SURG ADULT REFERRAL  For pain he will continue with the Tylenol and I suggested lidocaine patches          (D69.6) Thrombocytopenia (H)  Comment: Patient states that he lost a lot of blood when he had tooth extraction  Plan: I will leave this up to the surgeon if he needs hematology consult before the surgery       Chief complaint:                                                      Lump on the back        SUBJECTIVE:   Caesar Hernandez is a 38 year old male who presents to clinic today for the following health issues:        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  At home his wife noticed a lump on the back.  He has not noticed it before.  It is painful at times.  He takes Tylenol.  He cannot take NSAIDs because of the low platelets.  He would like to know what else he can take for pain.  Stronger than Tylenol would be opioids medications and I do not think his pain is severe enough to go this route.  He agrees    ED/UC Followup:    Facility:  [facility]  Date of visit:  "3-  Reason for visit: lump on back            Review of Systems:                                                      ROS: negative for fever, chills, cough, wheezes, chest pain, shortness of breath, vomiting, abdominal pain, leg swelling      OBJECTIVE:             Physical exam:   Blood pressure 114/64, pulse 80, temperature 98.7  F (37.1  C), temperature source Oral, resp. rate 16, height 1.657 m (5' 5.25\"), weight 84.1 kg (185 lb 4.8 oz), SpO2 98 %.     NAD, appears comfortable  Skin: no rashes   Neck: supple, no JVD,  No thyroidmegaly. Lymph nodes nonpalpable cervical and supraclavicular.  Chest: clear to auscultation bilaterally, good respiratory effort  Over the right scapula there is big lipoma about 1 cm in size.  No redness.  Mild tenderness  Heart: S1 S2, RRR, no mgr appreciated  Abdomen: soft, not tender,   Extremities: no edema,   Neurologic: A, Ox3, no focal signs appreciated    PMHx: reviewed  Past Medical History:   Diagnosis Date     Coagulation disorder (H)      Exercise-induced asthma 3/20/2014     Gastro-oesophageal reflux disease      Heart murmur      Other chronic pain     foot pain bilat      PSHx: reviewed  Past Surgical History:   Procedure Laterality Date     EXTRACTION(S) DENTAL Bilateral 6/24/2015    Procedure: EXTRACTION(S) DENTAL;  Surgeon: Selam Castillo DMD;  Location: [facility] OR     HERNIA REPAIR       TESTICLE SURGERY      undescended on right        Meds: reviewed  Current Outpatient Medications   Medication Sig Dispense Refill     Omeprazole (PRILOSEC PO) Take 20 mg by mouth every morning       order for DME Equipment being ordered: orthotics for both feet, custom 1 Device 0     ELIDEL 1 % cream   3     triamcinolone (KENALOG) 0.1 % cream   2       Soc Hx: reviewed  Fam Hx: reviewed          Polina Bazzi MD  Internal Medicine     "

## 2019-03-25 NOTE — PATIENT INSTRUCTIONS
Plan:  1. Surgeon referral   [place] [phone]   [email or web address]  [place] [phone]   [email or web address]  [facility]: General Surgery [facility] [phone]   [email or web address]  2. Tylenol max 2000 mg daily  3. Lidocaine patch 4% - use 1-3 patches to the painful area for maximum 12 h a day ( you need to be off patches for 12h at least). You will find these patches over the counter, no prescription is needed.

## 2019-03-27 ENCOUNTER — OFFICE VISIT (OUTPATIENT)
Dept: FAMILY MEDICINE | Facility: CLINIC | Age: 39
End: 2019-03-27

## 2019-03-27 VITALS
BODY MASS INDEX: 31.32 KG/M2 | WEIGHT: 188 LBS | OXYGEN SATURATION: 96 % | DIASTOLIC BLOOD PRESSURE: 76 MMHG | HEIGHT: 65 IN | HEART RATE: 69 BPM | SYSTOLIC BLOOD PRESSURE: 110 MMHG | TEMPERATURE: 97.6 F

## 2019-03-27 DIAGNOSIS — Z01.818 PRE-OP EXAM: Primary | ICD-10-CM

## 2019-03-27 DIAGNOSIS — D69.42 TAR SYNDROME (H): ICD-10-CM

## 2019-03-27 DIAGNOSIS — L30.9 DERMATITIS: ICD-10-CM

## 2019-03-27 DIAGNOSIS — Q87.2 TAR SYNDROME (H): ICD-10-CM

## 2019-03-27 DIAGNOSIS — D17.30 LIPOMA OF SKIN AND SUBCUTANEOUS TISSUE: ICD-10-CM

## 2019-03-27 PROBLEM — K21.9 GERD (GASTROESOPHAGEAL REFLUX DISEASE): Status: ACTIVE | Noted: 2019-03-27

## 2019-03-27 LAB
ERYTHROCYTE [DISTWIDTH] IN BLOOD BY AUTOMATED COUNT: 16 %
HCT VFR BLD AUTO: 45.1 % (ref 40–53)
HEMOGLOBIN: 14.7 G/DL (ref 13.3–17.7)
MCH RBC QN AUTO: 29.8 PG (ref 26–33)
MCHC RBC AUTO-ENTMCNC: 32.6 G/DL (ref 31–36)
MCV RBC AUTO: 91.2 FL (ref 78–100)
PLATELET COUNT - QUEST: 49 10^9/L (ref 150–375)
RBC # BLD AUTO: 4.94 10*12/L (ref 4.4–5.9)
WBC # BLD AUTO: 8.6 10*9/L (ref 4–11)

## 2019-03-27 PROCEDURE — 36415 COLL VENOUS BLD VENIPUNCTURE: CPT | Performed by: FAMILY MEDICINE

## 2019-03-27 PROCEDURE — 99214 OFFICE O/P EST MOD 30 MIN: CPT | Performed by: FAMILY MEDICINE

## 2019-03-27 PROCEDURE — 85027 COMPLETE CBC AUTOMATED: CPT | Performed by: FAMILY MEDICINE

## 2019-03-27 RX ORDER — DESONIDE 0.5 MG/G
CREAM TOPICAL 2 TIMES DAILY
Qty: 15 G | Refills: 0 | Status: SHIPPED | OUTPATIENT
Start: 2019-03-27 | End: 2019-07-26

## 2019-03-27 ASSESSMENT — MIFFLIN-ST. JEOR: SCORE: 1699.64

## 2019-03-27 NOTE — PROGRESS NOTES
[facility]  [address]  [address]  [facility] [address]  [phone]  Dept: [phone]    PRE-OP EVALUATION:  Today's date: 3/27/2019    Caesar Hernandez (: 1980) presents for pre-operative evaluation assessment as requested by Dr. Jones.  He requires evaluation and anesthesia risk assessment prior to undergoing surgery/procedure for treatment of back lipoma .    Proposed Surgery/ Procedure: EXCISE BACK LIPOMA  Date of Surgery/ Procedure: 19  Time of Surgery/ Procedure: 10:35 am  Hospital/Surgical Facility: [facility]  Can see in [facility]   Primary Physician: Jose Vicente  Type of Anesthesia Anticipated: General    Patient has a Health Care Directive or Living Will:  NO    1. NO - Do you have a history of heart attack, stroke, stent, bypass or surgery on an artery in the head, neck, heart or legs?  2. NO - Do you ever have any pain or discomfort in your chest?  3. NO - Do you have a history of  Heart Failure?  4. NO - Are you troubled by shortness of breath when: walking on the level, up a slight hill or at night?  5. NO - Do you currently have a cold, bronchitis or other respiratory infection?  6. NO - Do you have a cough, shortness of breath or wheezing?  7. NO - Do you sometimes get pains in the calves of your legs when you walk?  8. NO - Do you or anyone in your family have previous history of blood clots?  9. YES - DO YOU OR DOES ANYONE IN YOUR FAMILY HAVE A SERIOUS BLEEDING PROBLEM SUCH AS PROLONGED BLEEDING FOLLOWING SURGERIES OR CUTS?  thrombocytopenia  10. NO - Have you ever had problems with anemia or been told to take iron pills?  11. NO - Have you had any abnormal blood loss such as black, tarry or bloody stools, or abnormal vaginal bleeding?  12. NO - Have you ever had a blood transfusion?  13. NO - Have you or any of your relatives ever had problems with anesthesia?  14. NO - Do you have sleep apnea, excessive snoring or daytime  drowsiness?  15. NO - Do you have any prosthetic heart valves?  16. NO - Do you have prosthetic joints?  17. NO - Is there any chance that you may be pregnant?      HPI:     HPI related to upcoming procedure: Lipoma, symptomatic on back      MEDICAL HISTORY:     Patient Active Problem List    Diagnosis Date Noted     GERD (gastroesophageal reflux disease) 03/27/2019     Priority: Medium     Lipoma of skin and subcutaneous tissue 03/27/2019     Priority: Medium     Vitiligo 07/18/2017     Priority: Medium     Health Care Home 12/27/2016     Priority: Medium     State Tier Level:  Tier 1  Status:  n/a  Care Coordinator:     See Letters for H Care Plan         ACP (advance care planning) 12/27/2016     Priority: Medium     Advance Care Planning 12/27/2016: ACP Review of Chart / Resources Provided:  Reviewed chart for advance care plan.  Caesar CERRATO Mary has been provided information and resources to begin or update their advance care plan.  Added by Wandy Avalos             Foot deformity, congenital 12/27/2016     Priority: Medium     Thrombocytopenia (H) 06/24/2015     Priority: Medium     Family history of ischemic heart disease 03/20/2014     Priority: Medium     TAR syndrome: thrombocytopenia and absent radius 03/06/2013     Priority: Medium      Past Medical History:   Diagnosis Date     Coagulation disorder (H)      Exercise-induced asthma 3/20/2014     Gastro-oesophageal reflux disease      Heart murmur      Other chronic pain     foot pain bilat     Past Surgical History:   Procedure Laterality Date     EXTRACTION(S) DENTAL Bilateral 6/24/2015    Procedure: EXTRACTION(S) DENTAL;  Surgeon: Selam Castillo DMD;  Location: [facility] OR     HERNIA REPAIR  2002     TESTICLE SURGERY  1987    undescended on right     Current Outpatient Medications   Medication Sig Dispense Refill     Omeprazole (PRILOSEC PO) Take 20 mg by mouth every morning       order for DME Equipment being ordered: orthotics for both feet, custom 1  "Device 0     OTC products: no recent use of OTC ASA, NSAIDS or Steroids    Allergies   Allergen Reactions     Aspirin      Nsaids Unknown     platelet abnormality      Latex Allergy: NO    Social History     Tobacco Use     Smoking status: Never Smoker     Smokeless tobacco: Never Used   Substance Use Topics     Alcohol use: Yes     Comment: rare use     History   Drug Use No       REVIEW OF SYSTEMS:   CONSTITUTIONAL: NEGATIVE for fever, chills, change in weight  ENT/MOUTH: NEGATIVE for ear, mouth and throat problems  RESP: NEGATIVE for significant cough or SOB  CV: NEGATIVE for chest pain, palpitations or peripheral edema  GI: NEGATIVE for nausea, abdominal pain, heartburn, or change in bowel habits  Skin: He complains of rash under both eyes- new onset    EXAM:   /76 (BP Location: Left arm, Patient Position: Sitting, Cuff Size: Adult Regular)   Pulse 69   Temp 97.6  F (36.4  C) (Oral)   Ht 1.651 m (5' 5\")   Wt 85.3 kg (188 lb)   SpO2 96%   BMI 31.28 kg/m    GENERAL APPEARANCE: healthy, alert and no distress  HENT: ear canals and TM's normal and nose and mouth without ulcers or lesions  RESP: lungs clear to auscultation - no rales, rhonchi or wheezes  Chest: subcutaneous mass right suprascapular area  CV: regular rate and rhythm,  and no significant murmur,  ABDOMEN: soft, nontender, no HSM or masses and bowel sounds normal  MS: congenital absence of radius-  SKIN:dermatitis under both eyes    DIAGNOSTICS:   CBC  Hemoglobin   Date Value Ref Range Status   07/18/2017 15.2 13.3 - 17.7 g/dL Final   ]  Platelet count: (baseline 30-60 thousand)  49,000    Recent Labs   Lab Test 07/18/17  1034 06/26/15  0645  05/18/15  1055   HGB 15.2 11.9*   < > 14.4   PLT 32* 60*   < > 52*     --   --  140   POTASSIUM 4.4  --   --  3.7   CR 1.08  --   --  0.98    < > = values in this interval not displayed.        IMPRESSION:   Reason for surgery/procedure: mass upper back- excision    The proposed surgical procedure " is considered LOW risk.    REVISED CARDIAC RISK INDEX  The patient has the following serious cardiovascular risks for perioperative complications such as (MI, PE, VFib and 3  AV Block):  No serious cardiac risks  INTERPRETATION: 0 risks: Class I (very low risk - 0.4% complication rate)    The patient has the following additional risks for perioperative complications:  LOW PLATELET COUNT         ICD-10-CM    1. Pre-op exam Z01.818 HEMOGRAM/PLATELET (BFP)     VENOUS COLLECTION   2. Lipoma of skin and subcutaneous tissue D17.30    3. Dermatitis L30.9 desonide (DESOWEN) 0.05 % external cream   4. TAR syndrome: thrombocytopenia and absent radius Q87.2        RECOMMENDATIONS:       --Patient is on no chronic medications (prn omeprazole)    5/8/2019  SURGERY RESCHEDULED FOR URI SYMPTOMS  TO MY KNOWLEDGE THESE SYMPTOMS HAVE RESOLVED  NO OTHER CHANGES TO HIS HEALTH HISTORY  APPROVAL GIVEN      APPROVAL GIVEN to proceed with proposed procedure, without further diagnostic evaluation       Signed Electronically by: Soheila Yen MD    Copy of this evaluation report is provided to requesting physician.    [facility] Preop Guidelines    Revised Cardiac Risk Index

## 2019-03-27 NOTE — PROGRESS NOTES
"[facility]  Surgery Consultation    PCP:  Jose Vicente [phone]    HPI: Patient is a 38-year-old gentleman who presents for evaluation of back lipoma.  He is referred by primary care for this.  He is unsure as to how long it has been there.  It does occasionally cause discomfort.    PMH:   has a past medical history of Coagulation disorder (H), Exercise-induced asthma (3/20/2014), Gastro-oesophageal reflux disease, Heart murmur, and Other chronic pain.  PSH:    has a past surgical history that includes hernia repair (2002); Testicle surgery (1987); and Extraction(s) dental (Bilateral, 6/24/2015).  Social History:   reports that  has never smoked. he has never used smokeless tobacco. He reports that he drinks alcohol. He reports that he does not use drugs.  Family History:   family history includes Chemical Addiction in his maternal grandfather; Heart Disease in his maternal grandfather and paternal grandfather.  Medications/Allergies: Home medications and allergies reviewed.    ROS:  The 10 point Review of Systems is negative other than noted in the HPI.    Physical Exam:  /64   Pulse 80   Ht 1.657 m (5' 5.25\")   Wt 83.9 kg (185 lb)   BMI 30.55 kg/m    GENERAL: Generally appears well.  Psych: Alert and Oriented.  Normal affect  Eyes: Sclera clear  Respiratory:  Lungs clear to ausculation bilaterally with good air excursion  Cardiovascular:  Regular Rate and Rhythm with no murmurs gallops or rubs, normal peripheral pulses  In the right upper back there is a well-circumscribed rather large mobile lipoma  Lymphatic/Hematologic/Immune:  No femoral or cervical lymphadenopathy.  Integumentary:  No rashes  Neurological: grossly intact    All new lab and imaging data was reviewed.     Impression and Plan:  Patient is a 38 year old male with back lipoma    PLAN: Options were discussed.  I think this is appropriate for excision.  The procedure was described in detail.  Risks, " benefits alternatives were discussed.  He will schedule at his convenience.        Thank you very much for this consult.    Calos Jones M.D.  [facility]  [phone]    Please route or send letter to:  Primary Care Provider (PCP) and Referring Provider

## 2019-04-01 ENCOUNTER — APPOINTMENT (OUTPATIENT)
Dept: SURGERY | Facility: PHYSICIAN GROUP | Age: 39
End: 2019-04-01
Attending: INTERNAL MEDICINE
Payer: COMMERCIAL

## 2019-04-01 ENCOUNTER — ANESTHESIA EVENT (OUTPATIENT)
Dept: SURGERY | Facility: CLINIC | Age: 39
End: 2019-04-01

## 2019-04-01 ENCOUNTER — ANESTHESIA (OUTPATIENT)
Dept: SURGERY | Facility: CLINIC | Age: 39
End: 2019-04-01

## 2019-04-01 ENCOUNTER — HOSPITAL ENCOUNTER (OUTPATIENT)
Facility: CLINIC | Age: 39
Discharge: HOME | End: 2019-04-01
Attending: SURGERY | Admitting: SURGERY
Payer: COMMERCIAL

## 2019-04-01 VITALS
HEIGHT: 66 IN | TEMPERATURE: 98 F | WEIGHT: 184.3 LBS | BODY MASS INDEX: 29.62 KG/M2 | HEART RATE: 98 BPM | SYSTOLIC BLOOD PRESSURE: 107 MMHG | OXYGEN SATURATION: 95 % | RESPIRATION RATE: 16 BRPM | DIASTOLIC BLOOD PRESSURE: 77 MMHG

## 2019-04-01 DIAGNOSIS — G89.18 POST-OP PAIN: Primary | ICD-10-CM

## 2019-04-01 RX ORDER — HYDROCODONE BITARTRATE AND ACETAMINOPHEN 5; 325 MG/1; MG/1
1 TABLET ORAL
Status: CANCELLED | OUTPATIENT
Start: 2019-04-01

## 2019-04-01 RX ORDER — CEFAZOLIN SODIUM 1 G/3ML
1 INJECTION, POWDER, FOR SOLUTION INTRAMUSCULAR; INTRAVENOUS SEE ADMIN INSTRUCTIONS
Status: DISCONTINUED | OUTPATIENT
Start: 2019-04-01 | End: 2019-04-01 | Stop reason: HOSPADM

## 2019-04-01 RX ORDER — CEFAZOLIN SODIUM 2 G/100ML
2 INJECTION, SOLUTION INTRAVENOUS
Status: DISCONTINUED | OUTPATIENT
Start: 2019-04-01 | End: 2019-04-01 | Stop reason: HOSPADM

## 2019-04-01 RX ORDER — HYDROCODONE BITARTRATE AND ACETAMINOPHEN 5; 325 MG/1; MG/1
1-2 TABLET ORAL EVERY 4 HOURS PRN
Qty: 10 TABLET | Refills: 0 | Status: SHIPPED | OUTPATIENT
Start: 2019-04-01 | End: 2019-04-01

## 2019-04-01 ASSESSMENT — MIFFLIN-ST. JEOR: SCORE: 1698.73

## 2019-04-01 NOTE — OR NURSING
Patient arrived sweating and coughing and blowing his nose.  States has had sore throat and cough since last Wednesday.  Last night fever was 101.3 degrees, per his wife.  MDA and surgeon consulted.  Case cancelled with agreement from patient. Discharged to home with wife.

## 2019-05-08 ENCOUNTER — TELEPHONE (OUTPATIENT)
Dept: FAMILY MEDICINE | Facility: CLINIC | Age: 39
End: 2019-05-08

## 2019-05-08 NOTE — TELEPHONE ENCOUNTER
Kelin from [facility], Dr. Jones's office, called stating that the patient had a preop with Dr. Yen on 3/27/19 and he had to cancel his surgery due to having URI symptoms. They rescheduled him for his surgery for this Friday and they are wondering if Dr. Yen is willing to addend her preop note stating that the changes are ok and he is still cleared for surgery. Kelin stated that if Dr. Yen is not comfortable doing this then the patient will have to have another preop appointment. The patient is doing fine now and feels much better and besides having his cold, no other symptoms or changes have come about. Routing to Dr. Yen for review, please addend note if you are ok clearing patient for rescheduled surgery.       Kelin's call back number is [phone] and patient would like a call to let him know what the verdict is as well, he can be reached at [phone]

## 2019-05-09 ENCOUNTER — ANESTHESIA EVENT (OUTPATIENT)
Dept: SURGERY | Facility: CLINIC | Age: 39
End: 2019-05-09
Payer: COMMERCIAL

## 2019-05-09 NOTE — TELEPHONE ENCOUNTER
[facility] were able to see this in the system and the patient was informed by them and I of this. He had no further questions or concerns at this time.

## 2019-05-10 ENCOUNTER — APPOINTMENT (OUTPATIENT)
Dept: SURGERY | Facility: PHYSICIAN GROUP | Age: 39
End: 2019-05-10
Payer: COMMERCIAL

## 2019-05-10 ENCOUNTER — HOSPITAL ENCOUNTER (OUTPATIENT)
Facility: CLINIC | Age: 39
Discharge: HOME | End: 2019-05-10
Attending: SURGERY | Admitting: SURGERY
Payer: COMMERCIAL

## 2019-05-10 ENCOUNTER — ANESTHESIA (OUTPATIENT)
Dept: SURGERY | Facility: CLINIC | Age: 39
End: 2019-05-10
Payer: COMMERCIAL

## 2019-05-10 VITALS
BODY MASS INDEX: 30.37 KG/M2 | HEIGHT: 66 IN | OXYGEN SATURATION: 95 % | HEART RATE: 78 BPM | WEIGHT: 189 LBS | SYSTOLIC BLOOD PRESSURE: 112 MMHG | RESPIRATION RATE: 15 BRPM | TEMPERATURE: 97 F | DIASTOLIC BLOOD PRESSURE: 84 MMHG

## 2019-05-10 DIAGNOSIS — G89.18 ACUTE POST-OPERATIVE PAIN: Primary | ICD-10-CM

## 2019-05-10 PROCEDURE — 25800030 ZZH RX IP 258 OP 636: Performed by: NURSE ANESTHETIST, CERTIFIED REGISTERED

## 2019-05-10 PROCEDURE — 36000052 ZZH SURGERY LEVEL 2 EA 15 ADDTL MIN: Performed by: SURGERY

## 2019-05-10 PROCEDURE — 40000169 ZZH STATISTIC PRE-PROCEDURE ASSESSMENT I: Performed by: SURGERY

## 2019-05-10 PROCEDURE — 37000008 ZZH ANESTHESIA TECHNICAL FEE, 1ST 30 MIN: Performed by: SURGERY

## 2019-05-10 PROCEDURE — 37000009 ZZH ANESTHESIA TECHNICAL FEE, EACH ADDTL 15 MIN: Performed by: SURGERY

## 2019-05-10 PROCEDURE — 36000050 ZZH SURGERY LEVEL 2 1ST 30 MIN: Performed by: SURGERY

## 2019-05-10 PROCEDURE — 25000128 H RX IP 250 OP 636: Performed by: NURSE ANESTHETIST, CERTIFIED REGISTERED

## 2019-05-10 RX ORDER — MEPERIDINE HYDROCHLORIDE 25 MG/ML
12.5 INJECTION INTRAMUSCULAR; INTRAVENOUS; SUBCUTANEOUS
Status: DISCONTINUED | OUTPATIENT
Start: 2019-05-10 | End: 2019-05-10 | Stop reason: HOSPADM

## 2019-05-10 RX ORDER — CEFAZOLIN SODIUM 2 G/100ML
2 INJECTION, SOLUTION INTRAVENOUS
Status: DISCONTINUED | OUTPATIENT
Start: 2019-05-10 | End: 2019-05-10 | Stop reason: HOSPADM

## 2019-05-10 RX ORDER — PROPOFOL 10 MG/ML
INJECTION, EMULSION INTRAVENOUS PRN
Status: DISCONTINUED | OUTPATIENT
Start: 2019-05-10 | End: 2019-05-10

## 2019-05-10 RX ORDER — NALOXONE HYDROCHLORIDE 0.4 MG/ML
.1-.4 INJECTION, SOLUTION INTRAMUSCULAR; INTRAVENOUS; SUBCUTANEOUS
Status: DISCONTINUED | OUTPATIENT
Start: 2019-05-10 | End: 2019-05-10 | Stop reason: HOSPADM

## 2019-05-10 RX ORDER — CEFAZOLIN SODIUM 1 G/3ML
1 INJECTION, POWDER, FOR SOLUTION INTRAMUSCULAR; INTRAVENOUS SEE ADMIN INSTRUCTIONS
Status: DISCONTINUED | OUTPATIENT
Start: 2019-05-10 | End: 2019-05-10 | Stop reason: HOSPADM

## 2019-05-10 RX ORDER — FENTANYL CITRATE 50 UG/ML
25-50 INJECTION, SOLUTION INTRAMUSCULAR; INTRAVENOUS
Status: DISCONTINUED | OUTPATIENT
Start: 2019-05-10 | End: 2019-05-10 | Stop reason: HOSPADM

## 2019-05-10 RX ORDER — HYDROCODONE BITARTRATE AND ACETAMINOPHEN 5; 325 MG/1; MG/1
1 TABLET ORAL
Status: DISCONTINUED | OUTPATIENT
Start: 2019-05-10 | End: 2019-05-10 | Stop reason: HOSPADM

## 2019-05-10 RX ORDER — ONDANSETRON 4 MG/1
4 TABLET, ORALLY DISINTEGRATING ORAL EVERY 30 MIN PRN
Status: DISCONTINUED | OUTPATIENT
Start: 2019-05-10 | End: 2019-05-10 | Stop reason: HOSPADM

## 2019-05-10 RX ORDER — SODIUM CHLORIDE, SODIUM LACTATE, POTASSIUM CHLORIDE, CALCIUM CHLORIDE 600; 310; 30; 20 MG/100ML; MG/100ML; MG/100ML; MG/100ML
INJECTION, SOLUTION INTRAVENOUS CONTINUOUS
Status: DISCONTINUED | OUTPATIENT
Start: 2019-05-10 | End: 2019-05-10 | Stop reason: HOSPADM

## 2019-05-10 RX ORDER — ONDANSETRON 2 MG/ML
4 INJECTION INTRAMUSCULAR; INTRAVENOUS EVERY 30 MIN PRN
Status: DISCONTINUED | OUTPATIENT
Start: 2019-05-10 | End: 2019-05-10 | Stop reason: HOSPADM

## 2019-05-10 RX ORDER — HYDROCODONE BITARTRATE AND ACETAMINOPHEN 5; 325 MG/1; MG/1
1-2 TABLET ORAL EVERY 4 HOURS PRN
Qty: 12 TABLET | Refills: 0 | Status: SHIPPED | OUTPATIENT
Start: 2019-05-10 | End: 2019-05-10

## 2019-05-10 RX ORDER — AMOXICILLIN 250 MG
1-2 CAPSULE ORAL 2 TIMES DAILY
Qty: 20 TABLET | Refills: 0 | Status: SHIPPED | OUTPATIENT
Start: 2019-05-10 | End: 2019-05-10

## 2019-05-10 RX ORDER — HYDROMORPHONE HYDROCHLORIDE 1 MG/ML
.3-.5 INJECTION, SOLUTION INTRAMUSCULAR; INTRAVENOUS; SUBCUTANEOUS EVERY 10 MIN PRN
Status: DISCONTINUED | OUTPATIENT
Start: 2019-05-10 | End: 2019-05-10 | Stop reason: HOSPADM

## 2019-05-10 RX ORDER — SODIUM CHLORIDE, SODIUM LACTATE, POTASSIUM CHLORIDE, CALCIUM CHLORIDE 600; 310; 30; 20 MG/100ML; MG/100ML; MG/100ML; MG/100ML
INJECTION, SOLUTION INTRAVENOUS CONTINUOUS PRN
Status: DISCONTINUED | OUTPATIENT
Start: 2019-05-10 | End: 2019-05-10

## 2019-05-10 RX ADMIN — PROPOFOL 100 MG: 10 INJECTION, EMULSION INTRAVENOUS at 10:57

## 2019-05-10 RX ADMIN — PROPOFOL 100 MG: 10 INJECTION, EMULSION INTRAVENOUS at 10:49

## 2019-05-10 RX ADMIN — PROPOFOL 50 MG: 10 INJECTION, EMULSION INTRAVENOUS at 10:45

## 2019-05-10 RX ADMIN — SODIUM CHLORIDE, POTASSIUM CHLORIDE, SODIUM LACTATE AND CALCIUM CHLORIDE: 600; 310; 30; 20 INJECTION, SOLUTION INTRAVENOUS at 10:45

## 2019-05-10 RX ADMIN — SUCCINYLCHOLINE CHLORIDE 100 MG: 20 INJECTION, SOLUTION INTRAMUSCULAR; INTRAVENOUS; PARENTERAL at 10:45

## 2019-05-10 RX ADMIN — PROPOFOL 50 MG: 10 INJECTION, EMULSION INTRAVENOUS at 10:47

## 2019-05-10 ASSESSMENT — MIFFLIN-ST. JEOR: SCORE: 1720.05

## 2019-05-10 NOTE — OR NURSING
Charge Nurse Carmen in talking to pt and wife- getting ready to get up to transport chair to be brought into phase 2 until Dr Jones sees pt.

## 2019-05-10 NOTE — ANESTHESIA CARE TRANSFER NOTE
Patient: Caesar Hernandez    Procedure(s):  EXCISION, MASS, BACK LIPOMA    Diagnosis: BACK LIPOMA  Diagnosis Additional Information: No value filed.    Anesthesia Type:   General, ETT     Note:  Airway :Face Mask  Patient transferred to:PACU  Comments: At end of procedure, spontaneous respirations, patient alert to voice, able to follow commands. Oxygen via facemask at 8 liters per minute to PACU. Oxygen tubing connected to wall O2 in PACU, SpO2, NiBP, and EKG monitors and alarms on and functioning, George Hugger warmer connected to patient gown, report on patient's clinical status given to PACU RN, RN questions answered.Handoff Report: Identifed the Patient, Identified the Reponsible Provider, Reviewed the pertinent medical history, Discussed the surgical course, Reviewed Intra-OP anesthesia mangement and issues during anesthesia, Set expectations for post-procedure period and Allowed opportunity for questions and acknowledgement of understanding      Vitals: (Last set prior to Anesthesia Care Transfer)    CRNA VITALS  5/10/2019 1049 - 5/10/2019 1147      5/10/2019             Resp Rate (set):  10                Electronically Signed By: VJ Puga CRNA  May 10, 2019  11:47 AM

## 2019-05-10 NOTE — ANESTHESIA POSTPROCEDURE EVALUATION
Patient: Caesar Hernandez    Procedure(s):  EXCISION, MASS, BACK LIPOMA    Diagnosis:BACK LIPOMA  Diagnosis Additional Information: No value filed.    Anesthesia Type:  General, ETT    Note:  Anesthesia Post Evaluation    Patient location during evaluation: PACU  Patient participation: Able to fully participate in evaluation  Level of consciousness: awake and alert  Pain management: adequate  Airway patency: patent  Cardiovascular status: acceptable and hemodynamically stable  Respiratory status: acceptable and unassisted  Hydration status: acceptable  PONV: none             Last vitals:  Vitals:    05/10/19 1145 05/10/19 1200 05/10/19 1215   BP: 114/76 112/84    Pulse: 86 78    Resp: 22 15 15   Temp:  36.1  C (97  F)    SpO2: 95% 94% 95%         Electronically Signed By: Ernesto Rojas DO  May 10, 2019  12:47 PM

## 2019-05-10 NOTE — OR NURSING
PATIENT UPSET ABOUT CANCELLED CASE, OFFERED PATIENT RELATIONS, PATIENT DECLINED. PATIENT WANTS TO WAIT TILL DR LINDA IS FINISHED TO TALK WITH HIM BEFORE DISCHARGE. PATIENT WANTS TO KEEP IV AND NPO TILL SEEN BY DR.     TALKED AT LENGTH WITH PATIENT AND MDA IN REGARDS TO SURGERY.     DR LINDA IN TO SEE PATIENT AND CONFIRMED THAT WE WOULD NOT DO THE SURGERY TODAY.     PATIENT NOW HOME WITH WIFE.

## 2019-05-10 NOTE — ANESTHESIA PREPROCEDURE EVALUATION
Anesthesia Pre-Procedure Evaluation    Patient: Caesar Hernandez   MRN: [id number] : 1980          Preoperative Diagnosis: BACK LIPOMA    Procedure(s):  EXCISION, MASS, BACK LIPOMA    Past Medical History:   Diagnosis Date     Coagulation disorder (H)      Exercise-induced asthma 3/20/2014     Gastro-oesophageal reflux disease      Heart murmur      Other chronic pain     foot pain bilat     Past Surgical History:   Procedure Laterality Date     EXTRACTION(S) DENTAL Bilateral 2015    Procedure: EXTRACTION(S) DENTAL;  Surgeon: Selam Castillo DMD;  Location: [facility] OR     HERNIA REPAIR       TESTICLE SURGERY      undescended on right       Anesthesia Evaluation     .             ROS/MED HX    ENT/Pulmonary:     (+)asthma , . .    Neurologic:       Cardiovascular:     (+) ----. : . . . :. valvular problems/murmurs .       METS/Exercise Tolerance:     Hematologic: Comments: Thrombocytopenia    (+) Anemia, -      Musculoskeletal: Comment: TAR Syndrome (Thrombocytopenia with absent radius)        GI/Hepatic:     (+) GERD       Renal/Genitourinary:         Endo:         Psychiatric:         Infectious Disease:         Malignancy:         Other:                          Physical Exam  Normal systems: cardiovascular, pulmonary and dental    Airway   Mallampati: II  TM distance: >3 FB  Neck ROM: full    Dental     Cardiovascular       Pulmonary             Lab Results   Component Value Date    WBC 8.6 2019    HGB 14.7 2019    HCT 45.1 2019    PLT 49 (A) 2019     2017    POTASSIUM 4.4 2017    CHLORIDE 110 (H) 2017    CO2 24 2017    BUN 18 2017    CR 1.08 2017    GLC 96 2017    DIANE 8.9 2017    ALBUMIN 4.1 2017    PROTTOTAL 7.0 2017     (H) 2017    AST 51 (H) 2017    ALKPHOS 80 2017    BILITOTAL 1.1 2017       Preop Vitals  BP Readings from Last 3 Encounters:   05/10/19 110/60   19  "107/77   03/27/19 110/76    Pulse Readings from Last 3 Encounters:   04/01/19 98   03/27/19 69   03/25/19 80      Resp Readings from Last 3 Encounters:   05/10/19 16   04/01/19 16   03/25/19 16    SpO2 Readings from Last 3 Encounters:   05/10/19 95%   04/01/19 95%   03/27/19 96%      Temp Readings from Last 1 Encounters:   05/10/19 36.3  C (97.4  F) (Temporal)    Ht Readings from Last 1 Encounters:   05/10/19 1.676 m (5' 6\")      Wt Readings from Last 1 Encounters:   05/10/19 85.7 kg (189 lb)    Estimated body mass index is 30.51 kg/m  as calculated from the following:    Height as of this encounter: 1.676 m (5' 6\").    Weight as of this encounter: 85.7 kg (189 lb).       Anesthesia Plan      History & Physical Review  History and physical reviewed and following examination; no interval change.    ASA Status:  3 .    NPO Status:  > 8 hours    Plan for General and ETT with Intravenous and Propofol induction. Maintenance will be Balanced.    PONV prophylaxis:  Ondansetron (or other 5HT-3) and Dexamethasone or Solumedrol  Additional equipment: Videolaryngoscope Plan inhalational sedation with nitrous oxide, start IV, followed by IV induction and GETA      Postoperative Care  Postoperative pain management:  IV analgesics and Multi-modal analgesia.      Consents  Anesthetic plan, risks, benefits and alternatives discussed with:  Patient..                 Ernesto Rojas DO  "

## 2019-05-10 NOTE — OR NURSING
In PACU pt is quite angry- verbalized frustrations re: surgery cancelled - Dr Rojas at bedside- explains safety reasons to cancel secondary to difficult intubation- pt still expresses anger- wants to wait and talk to Dr Jones about possibility of having surgery done today anyway. Dr Jones in OR- pt understands he may have to wait- wife brought in- sitting and talking w pt at this time- Charge Nurse updated- will have pt transferred to phase 2 until he sees Dr Jones- pt will be kept NPO and IV fluids running. Pt agrees to this plan.

## 2019-05-10 NOTE — PROGRESS NOTES
"Anesthesiology progress note:  Patient identified and interviewed in Same Day Surgery pre-op. Medical, surgical and anesthetic history reviewed. Patient with TAR syndrome (musculoskeletal abnormalities, thrombocytopenia). Presented today for lipoma removal under general anesthesia (prone positioning). Patient with anxiety of needles and stated that he had IVs placed in the past using \"anesthesia gas\" for sedation prior to IV placement.     Patient taken to OR 24. ASA monitors placed. 60% N2O by facemask until effect noted (patient giggling). Tourniquets to both lower extremities. During IV placement patient became slightly disinhibited (likely stage 2 of anesthesia), anesthesia deepened with 8% sevoflurane.  Easy BMV throughout, vital signs stable. IV started in right lower extremity. Propofol/succinylcholine administered as charted. IDL x 1 with Glidescope by CRNA, view of epiglottis achieved-Grade 3 equivalent, however, unable to pass 8.0 OETT. BMV with oral airway with 15 L/min 100% FiO2.Deepened anesthetic with propofol. IDL again with 7.0 OETT, grade 3 view, again unable to pass ETT. Small amount of bleeding noted in the posterior oropharynx. Again BMV achievable (but somewhat challenging with oral airway), help called for. Additional MDA x 2 to the room. Decision to use glidescope one more time as well as fiberoptic scope to try to intubate patient. IDL again with glidescope and able to achieve full view of glottis. Despite several attempts, unable to get fiberoptic through the vocal cords. Decision to wake patient up and recover from sedation. Patient assisted with oral airway until wake enough to resume spontaneous ventilation. Taken to recovery in stable condition. Vital signs, hemodynamics stable throughout this entire process.    I spoke with the patient's wife in the [facility] family room and informed her that although we were able to gain IV access, we were unable to intubate the patient and had to wake him " up. I explained that we did this for his safety and to prevent additional harm (given his underlying thrombocytopenia and risk of additional airway injury/swelling.) For his safety, we recommend attempting the procedure in a few weeks when swelling has subsided. I also spoke with the patient at length about the events during his surgery. Understandably, the patient is very frustrated and upset about what happened (the patient had been previously cancelled) and has difficulty with IV access. I expressed my sincere apologies to the patient, but informed him that we used our best judgement throughout to minimize unnecessary risk and that the best thing to do was to abandon our airway management plan and wake him up without additional airway manipulation during this elective case.    Ernesto Rojas D.O.  Staff Anesthesiologist  [facility], [facility]

## 2019-05-10 NOTE — ADDENDUM NOTE
Addendum  created 05/10/19 1323 by Neha Ibarra APRN CRNA    Intraprocedure Event deleted, Intraprocedure Event edited

## 2019-07-26 ENCOUNTER — OFFICE VISIT (OUTPATIENT)
Dept: FAMILY MEDICINE | Facility: CLINIC | Age: 39
End: 2019-07-26

## 2019-07-26 VITALS
BODY MASS INDEX: 30.47 KG/M2 | HEART RATE: 79 BPM | OXYGEN SATURATION: 95 % | SYSTOLIC BLOOD PRESSURE: 112 MMHG | WEIGHT: 188.8 LBS | TEMPERATURE: 97.9 F | DIASTOLIC BLOOD PRESSURE: 76 MMHG

## 2019-07-26 DIAGNOSIS — J02.0 STREP THROAT: Primary | ICD-10-CM

## 2019-07-26 LAB — S PYO AG THROAT QL IA.RAPID: ABNORMAL

## 2019-07-26 PROCEDURE — 87880 STREP A ASSAY W/OPTIC: CPT | Performed by: PHYSICIAN ASSISTANT

## 2019-07-26 PROCEDURE — 99213 OFFICE O/P EST LOW 20 MIN: CPT | Performed by: PHYSICIAN ASSISTANT

## 2019-07-26 RX ORDER — AMOXICILLIN 875 MG
875 TABLET ORAL 2 TIMES DAILY
Qty: 20 TABLET | Refills: 0 | Status: SHIPPED | OUTPATIENT
Start: 2019-07-26 | End: 2019-12-04

## 2019-07-26 NOTE — NURSING NOTE
Caesar is here today for a sore throat.    Pre-visit Screening:  Immunizations:  up to date  Colonoscopy:  NA  Mammogram: NA  Asthma Action Test/Plan:  NA  PHQ9:  NA  GAD7:  NA  Questioned patient about current smoking habits Pt. has never smoked.  Ok to leave detailed message on voice mail for today's visit only Yes, phone # [phone]

## 2019-07-26 NOTE — PROGRESS NOTES
CC: Sore throat    History:  Caesar felt somewhat tired the last few days. This morning had throat irritation. Later in the day throat was hurting, and felt like he needed to cough up mucous. Felt a different pain at the back of his mouth that he had felt that before. Did feel this again later in the day, but now feels like there is a throbbing. 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 No fever, sweats, chill, SOB. Does admit that he has been having worse sleep quality.     Has not taken any medications yet today.     PMH, MEDICATIONS, ALLERGIES, SOCIAL AND FAMILY HISTORY in [facility] and reviewed by me personally.    ROS negative other than the symptoms noted above in the HPI.      Examination   /76 (BP Location: Left arm, Patient Position: Sitting, Cuff Size: Adult Regular)   Pulse 79   Temp 97.9  F (36.6  C) (Oral)   Wt 85.6 kg (188 lb 12.8 oz)   SpO2 95%   BMI 30.47 kg/m         Constitutional: Sitting comfortably, in no acute distress. Vital signs noted  Eyes: pupils equal round reactive to light and accomodation, extra ocular movements intact  Ears: external canals and TMs free of abnormalities  Nose: patent, without mucosal abnormalities  Mouth and throat: without erythema or lesions of the mucosa. Tonsils with erythema and hypertrophy, but no exudate. No evidence of abscess.  Neck:  Mild anterior cervical adenopathy, trachea midline and normal to palpation  Cardiovascular:  regular rate and rhythm, no murmurs, clicks, or gallops  Respiratory:  normal respiratory rate and rhythm, lungs clear to auscultation  SKIN: No jaundice/pallor/rash.   Psychiatric: mentation appears normal and affect normal/bright        A/P    ICD-10-CM    1. Strep throat J02.0 RAPID STREP ([facility])     amoxicillin (AMOXIL) 875 MG tablet       DISCUSSION:  RST positive for strep. Recommended 10 day course of amoxicillin taken twice daily. Warned of side effects, and to contact me if  noted. Take with food. Can also use ibuprofen as needed to help with pain and inflammation. Take with food. Encouraged warm salt water gurgles to soothe tonsils. Contact me in 1 week if not significantly better, or sooner if worsening.      follow up visit: As needed    Chelsea Mosqueda PA-C  [facility]

## 2019-12-04 ENCOUNTER — OFFICE VISIT (OUTPATIENT)
Dept: FAMILY MEDICINE | Facility: CLINIC | Age: 39
End: 2019-12-04

## 2019-12-04 VITALS
DIASTOLIC BLOOD PRESSURE: 60 MMHG | SYSTOLIC BLOOD PRESSURE: 104 MMHG | HEART RATE: 83 BPM | TEMPERATURE: 99.2 F | OXYGEN SATURATION: 97 % | WEIGHT: 190 LBS | BODY MASS INDEX: 30.67 KG/M2

## 2019-12-04 DIAGNOSIS — R51.9 ACUTE NONINTRACTABLE HEADACHE, UNSPECIFIED HEADACHE TYPE: Primary | ICD-10-CM

## 2019-12-04 DIAGNOSIS — D69.42 TAR SYNDROME (H): ICD-10-CM

## 2019-12-04 DIAGNOSIS — Q87.2 TAR SYNDROME (H): ICD-10-CM

## 2019-12-04 LAB
% GRANULOCYTES: 66.4 %
HCT VFR BLD AUTO: 47.8 % (ref 40–53)
HEMOGLOBIN: 15.4 G/DL (ref 13.3–17.7)
LYMPHOCYTES NFR BLD AUTO: 25.6 %
MCH RBC QN AUTO: 29.9 PG (ref 26–33)
MCHC RBC AUTO-ENTMCNC: 32.2 G/DL (ref 31–36)
MCV RBC AUTO: 92.9 FL (ref 78–100)
MONOCYTES NFR BLD AUTO: 8 %
PLATELET COUNT - QUEST: 58 10^9/L (ref 150–375)
RBC # BLD AUTO: 5.15 10*12/L (ref 4.4–5.9)
WBC # BLD AUTO: 11.3 10*9/L (ref 4–11)

## 2019-12-04 PROCEDURE — 85025 COMPLETE CBC W/AUTO DIFF WBC: CPT | Performed by: FAMILY MEDICINE

## 2019-12-04 PROCEDURE — 36415 COLL VENOUS BLD VENIPUNCTURE: CPT | Performed by: FAMILY MEDICINE

## 2019-12-04 PROCEDURE — 99214 OFFICE O/P EST MOD 30 MIN: CPT | Performed by: FAMILY MEDICINE

## 2019-12-04 NOTE — NURSING NOTE
Caesar is here for ongoing headache for 4 days.          Pre-visit Screening:  Immunizations:  up to date  Colonoscopy:  NA  Mammogram: NA  Asthma Action Test/Plan:  NA  PHQ9:  None  GAD7:  None  Questioned patient about current smoking habits Pt. has never smoked.  Ok to leave detailed message on voice mail for today's visit only Yes, phone # [phone]

## 2019-12-04 NOTE — PROGRESS NOTES
Subjective     Caesar Hernandez is a 39 year old male who presents to clinic today for the following health issues:    HPI   Headache  Onset: 4 days ago    Went to  in [place] 2 days ago- no tests done    Description:   Location: bilateral in the frontal area   Character: dull pain  Frequency:  Wax and wane  Duration:  4 + days    Intensity: mild    Progression of Symptoms:  waxing and waning    Accompanying Signs & Symptoms:  Stiff neck: no   Neck or upper back pain: no   Fever: no   Sinus pressure: no   Nausea or vomiting: no   Dizziness: no   Numbness: no   Weakness: no   Visual changes: no     History:   Head trauma: no   Family history of migraines: no  Previous tests for headaches: no  Neurologist evaluations: no  Able to do daily activities: no  Wake with a headaches: no  Do headaches wake you up: no  Daily pain medication use: no  Work/school stressors/changes: no    Precipitating factors:   Does light make it worse: no  Does sound make it worse: no    Alleviating factors:  Does sleep help: YES    Therapies Tried and outcome: Tylenol    Pt has TAR syndrome which causes thrombocytopenia    Patient Active Problem List   Diagnosis     TAR syndrome: thrombocytopenia and absent radius     Family history of ischemic heart disease     Thrombocytopenia (H)     Health Care Home     ACP (advance care planning)     Foot deformity, congenital     Vitiligo     GERD (gastroesophageal reflux disease)     Lipoma of skin and subcutaneous tissue     Past Surgical History:   Procedure Laterality Date     EXTRACTION(S) DENTAL Bilateral 6/24/2015    Procedure: EXTRACTION(S) DENTAL;  Surgeon: Selam Castillo DMD;  Location: [facility] OR     HERNIA REPAIR  2002     TESTICLE SURGERY  1987    undescended on right       Social History     Tobacco Use     Smoking status: Never Smoker     Smokeless tobacco: Never Used   Substance Use Topics     Alcohol use: Yes     Comment: rare     Family History   Problem Relation Age of Onset     Heart  Disease Maternal Grandfather      Chemical Addiction Maternal Grandfather      Heart Disease Paternal Grandfather      Diabetes No family hx of          Current Outpatient Medications   Medication Sig Dispense Refill     Omeprazole (PRILOSEC PO) Take 20 mg by mouth every morning       order for DME Equipment being ordered: orthotics for both feet, custom 1 Device 0     Allergies   Allergen Reactions     Aspirin      Nsaids Unknown     platelet abnormality     Recent Labs   Lab Test 07/18/17  1034 05/18/15  1055 03/21/14  1004   * 111 139*   HDL 56 56 49   TRIG 126 93 96   * 62  --    CR 1.08 0.98  --    GFRESTIMATED 77 87  --    GFRESTBLACK >90   GFR Calc   >90   GFR Calc    --    POTASSIUM 4.4 3.7  --       BP Readings from Last 3 Encounters:   12/04/19 104/60   07/26/19 112/76   05/10/19 112/84    Wt Readings from Last 3 Encounters:   12/04/19 86.2 kg (190 lb)   07/26/19 85.6 kg (188 lb 12.8 oz)   05/10/19 85.7 kg (189 lb)                      Reviewed and updated as needed this visit by Provider         Review of Systems   ROS COMP: Constitutional, HEENT, cardiovascular, pulmonary, gi and gu systems are negative, except as otherwise noted.      Objective    /60 (BP Location: Right arm, Patient Position: Sitting, Cuff Size: Adult Large)   Pulse 83   Temp 99.2  F (37.3  C) (Oral)   Wt 86.2 kg (190 lb)   SpO2 97%   BMI 30.67 kg/m    Body mass index is 30.67 kg/m .  Physical Exam   GENERAL: healthy, alert and no distress  EYES: Eyes grossly normal to inspection, PERRL and conjunctivae and sclerae normal  HENT: ear canals and TM's normal, nose and mouth without ulcers or lesions  NECK: no adenopathy, no asymmetry, masses, or scars and thyroid normal to palpation  RESP: lungs clear to auscultation - no rales, rhonchi or wheezes  CV: regular rate and rhythm, normal S1 S2, no S3 or S4, no murmur, click or rub, no peripheral edema and peripheral pulses  strong  ABDOMEN: soft, nontender, no hepatosplenomegaly, no masses and bowel sounds normal  MS: no gross musculoskeletal defects noted, no edema  SKIN: no suspicious lesions or rashes  NEURO: Normal strength and tone, mentation intact and speech normal  NEURO: cranial nerves 2-12 intact  PSYCH: mentation appears normal, affect normal/bright    Diagnostic Test Results:  Labs reviewed in Epic  Results for orders placed or performed in visit on 12/04/19 (from the past 24 hour(s))   CL AFF HEMOGRAM/PLATE/DIFF ([facility])   Result Value Ref Range    WBC 11.3 (A) 4.0 - 11 10*9/L    RBC Count 5.15 4.4 - 5.9 10*12/L    Hemoglobin 15.4 13.3 - 17.7 g/dL    Hematocrit 47.8 40.0 - 53.0 %    MCV 92.9 78 - 100 fL    MCH 29.9 26 - 33 pg    MCHC 32.2 31 - 36 g/dL    Platelet Count 58 (A) 150 - 375 10^9/L    % Granulocytes 66.4 %    % Lymphocytes 25.6 %    % Monocytes 8.0 %           Assessment & Plan   Assessment      Plan  (R51) Acute nonintractable headache, unspecified headache type  (primary encounter diagnosis)  Comment: no red flag or progressive symptoms , not worst in life, suspect tension HA-pt under a lot of stress with infant, work and financial concerns  Plan: CL AFF HEMOGRAM/PLATE/DIFF ([facility])        Recommend rest, more sleep, less sugar and processed carbs, less caffeine, tylenol prn but not daily, exercise-f/u if not improving or worsening -consider imaging if changing/worsening despite conservative cares    patient given instructions to go to emergency department immediately if worsening of symptoms and verbalizes this understanding     (Q87.2) TAR syndrome: thrombocytopenia and absent radius  Comment: stable PLT count reassuring-no reason to suspect any relation to HA  Plan: CL AFF HEMOGRAM/PLATE/DIFF ([facility])                Regular exercise    No follow-ups on file.    Jose Vicente MD  [facility]

## 2020-03-17 ENCOUNTER — TELEPHONE (OUTPATIENT)
Dept: FAMILY MEDICINE | Facility: CLINIC | Age: 40
End: 2020-03-17

## 2020-03-17 NOTE — TELEPHONE ENCOUNTER
"Pt paged me at 5:30.   Pt has thrombocytopenia and is unsure if he should go to work    Current CDC recommendation only says \"blood disorders\" are a risk factor    I advised pt to stay home today and I will have his PCP  Call with further recommendations      Stephanie Malone PA-C  3/17/2020    "

## 2020-03-17 NOTE — TELEPHONE ENCOUNTER
Pt has chronic thrombocytopenia- per cdc recommendations blood disorders are a risk factor for more severe disease    I will provide letter to stay home

## 2020-03-17 NOTE — LETTER
[facility]  [address], [address]  [place]  [address]    March 17, 2020        RE: Caesar Hernandez  [address] [address] [facility] [address]            To Whom It May Concern,     Mr. Hernandez is followed here for his primary care needs.  I would recommend he work from home at this time due to covid-19 concerns.          If you have any further questions or problems, please contact our office at [phone].          Jose Vicente MD

## 2020-05-26 ENCOUNTER — APPOINTMENT (OUTPATIENT)
Dept: GENERAL RADIOLOGY | Facility: CLINIC | Age: 40
End: 2020-05-26
Attending: EMERGENCY MEDICINE
Payer: COMMERCIAL

## 2020-05-26 ENCOUNTER — HOSPITAL ENCOUNTER (EMERGENCY)
Facility: CLINIC | Age: 40
Discharge: HOME | End: 2020-05-26
Attending: EMERGENCY MEDICINE | Admitting: EMERGENCY MEDICINE
Payer: COMMERCIAL

## 2020-05-26 VITALS
SYSTOLIC BLOOD PRESSURE: 119 MMHG | DIASTOLIC BLOOD PRESSURE: 76 MMHG | RESPIRATION RATE: 18 BRPM | HEART RATE: 61 BPM | OXYGEN SATURATION: 98 %

## 2020-05-26 DIAGNOSIS — R07.9 CHEST PAIN, UNSPECIFIED TYPE: ICD-10-CM

## 2020-05-26 LAB
ANION GAP SERPL CALCULATED.3IONS-SCNC: 5 MMOL/L (ref 3–14)
BASOPHILS # BLD AUTO: 0.1 10E9/L (ref 0–0.2)
BASOPHILS NFR BLD AUTO: 0.6 %
BUN SERPL-MCNC: 20 MG/DL (ref 7–30)
CALCIUM SERPL-MCNC: 8.3 MG/DL (ref 8.5–10.1)
CHLORIDE SERPL-SCNC: 111 MMOL/L (ref 94–109)
CO2 SERPL-SCNC: 24 MMOL/L (ref 20–32)
CREAT SERPL-MCNC: 0.93 MG/DL (ref 0.66–1.25)
DIFFERENTIAL METHOD BLD: ABNORMAL
EOSINOPHIL # BLD AUTO: 0.3 10E9/L (ref 0–0.7)
EOSINOPHIL NFR BLD AUTO: 3.9 %
ERYTHROCYTE [DISTWIDTH] IN BLOOD BY AUTOMATED COUNT: 15.3 % (ref 10–15)
GFR SERPL CREATININE-BSD FRML MDRD: >90 ML/MIN/{1.73_M2}
GLUCOSE SERPL-MCNC: 98 MG/DL (ref 70–99)
HCT VFR BLD AUTO: 44 % (ref 40–53)
HGB BLD-MCNC: 14.3 G/DL (ref 13.3–17.7)
IMM GRANULOCYTES # BLD: 0 10E9/L (ref 0–0.4)
IMM GRANULOCYTES NFR BLD: 0.4 %
INTERPRETATION ECG - MUSE: NORMAL
LYMPHOCYTES # BLD AUTO: 2.7 10E9/L (ref 0.8–5.3)
LYMPHOCYTES NFR BLD AUTO: 35.3 %
MCH RBC QN AUTO: 29.2 PG (ref 26.5–33)
MCHC RBC AUTO-ENTMCNC: 32.5 G/DL (ref 31.5–36.5)
MCV RBC AUTO: 90 FL (ref 78–100)
MONOCYTES # BLD AUTO: 0.6 10E9/L (ref 0–1.3)
MONOCYTES NFR BLD AUTO: 7.3 %
NEUTROPHILS # BLD AUTO: 4.1 10E9/L (ref 1.6–8.3)
NEUTROPHILS NFR BLD AUTO: 52.5 %
NRBC # BLD AUTO: 0 10*3/UL
NRBC BLD AUTO-RTO: 0 /100
PLATELET # BLD AUTO: 57 10E9/L (ref 150–450)
POTASSIUM SERPL-SCNC: 3.9 MMOL/L (ref 3.4–5.3)
RBC # BLD AUTO: 4.89 10E12/L (ref 4.4–5.9)
SODIUM SERPL-SCNC: 140 MMOL/L (ref 133–144)
TROPONIN I SERPL-MCNC: <0.015 UG/L (ref 0–0.04)
WBC # BLD AUTO: 7.8 10E9/L (ref 4–11)

## 2020-05-26 PROCEDURE — 99285 EMERGENCY DEPT VISIT HI MDM: CPT | Mod: 25

## 2020-05-26 PROCEDURE — 85025 COMPLETE CBC W/AUTO DIFF WBC: CPT | Performed by: EMERGENCY MEDICINE

## 2020-05-26 PROCEDURE — 36415 COLL VENOUS BLD VENIPUNCTURE: CPT | Performed by: EMERGENCY MEDICINE

## 2020-05-26 PROCEDURE — 84484 ASSAY OF TROPONIN QUANT: CPT | Performed by: EMERGENCY MEDICINE

## 2020-05-26 PROCEDURE — 80048 BASIC METABOLIC PNL TOTAL CA: CPT | Performed by: EMERGENCY MEDICINE

## 2020-05-26 PROCEDURE — 93005 ELECTROCARDIOGRAM TRACING: CPT

## 2020-05-26 PROCEDURE — 71046 X-RAY EXAM CHEST 2 VIEWS: CPT

## 2020-05-26 ASSESSMENT — ENCOUNTER SYMPTOMS
NERVOUS/ANXIOUS: 1
SHORTNESS OF BREATH: 0
NUMBNESS: 1
LIGHT-HEADEDNESS: 1

## 2020-05-26 NOTE — ED PROVIDER NOTES
History     Chief Complaint:  Chest Pain and Tingling    HPI   Caesar Hernandez is a 39 year old male with a history TAR syndrome, GERD and a coagulation disorder who presents with chest pain and tingling.  The patient states that he has had some intermittent chest twinges over the last couple days especially at night. He states that these are very intermittent and fleeting. The patient denies any shortness of breath.  He states that tonight he was laying in bed and noticed some tingling and weakness in his left hand. He notes some anxiety recently today.     Cardiac/PE/DVT Risk Factors:  History of hypertension - no  History of hyperlipidemia - no  History of diabetes - no  History of smoking - no  Personal history of PE/DVT - no  Family history of PE/DVT - no  Family history of heart complications - yes  Recent travel - no  Recent surgery - no  Other immobilizations - no  Cancer - no    Allergies:  Aspirin   NSAIDS      Medications:    Prilosec      Past Medical History:    Coagulation disorder   Asthma   Difficult intubation   GERD  Heart murmur   TAR syndrome     Past Surgical History:    Hernia repair   Testicle surgery     Family History:    No past pertinent family history.    Social History:  Smoking Status: Never Smoker  Smokeless Tobacco: Never Used  Alcohol Use: Positive  Marital Status:        Review of Systems   Respiratory: Negative for shortness of breath.    Cardiovascular: Positive for chest pain.   Neurological: Positive for light-headedness and numbness.   Psychiatric/Behavioral: The patient is nervous/anxious.    All other systems reviewed and are negative.    Physical Exam     Patient Vitals for the past 24 hrs:   BP Pulse Resp SpO2   05/26/20 0445 119/76 -- 18 98 %   05/26/20 0300 115/84 -- -- 98 %   05/26/20 0245 119/88 61 -- 98 %   05/26/20 0145 (!) 137/91 70 -- --       Physical Exam  General: Patient is alert and interactive when I enter the room  Head:  The scalp, face, and head appear  normal  Eyes:  Conjunctivae are normal  ENT:    The nose is normal    Pinnae are normal    External acoustic canals are normal  Neck:  Trachea midline  CV:  Pulses are normal, RRR  Resp:  No respiratory distress, CTAB   Abdomen:      Soft, non-tender, non-distended  Musc:  Normal muscular tone    No major joint effusions    No asymmetric leg swelling  Skin:  No rash or lesions noted  Neuro:  Speech is normal and fluent. Face is symmetric.     Moving all extremities well.   Psych: Awake. Alert.  Normal affect.  Appropriate interactions.    Emergency Department Course     ECG:  ECG taken at 0130, ECG read at 0132  Normal sinus rhythm  normal ECG  Rate 65 bpm. MS interval 136 ms. QRS duration 84 ms. QT/QTc 374/388 ms. P-R-T axes 8 14 12.    Imaging:  Radiology findings were communicated with the patient who voiced understanding of the findings.    Chest XR:  1.  No acute abnormality.   Reading per radiology    Laboratory:  Laboratory findings were communicated with the patient who voiced understanding of the findings.    CBC: WBC 7.8, HGB 14.3, PLT 57(L)    BMP: Cl 111 (H) Ca 8.3 (L) o/w WNL (Creatinine 0.93)    Troponin 0236: <0.015     Emergency Department Course:     Nursing notes and vitals reviewed.    0147 I performed an exam of the patient as documented above.     0236 IV was inserted and blood was drawn for laboratory testing, results above.    0450 I returned to check on patient.  I personally reviewed the laboratory and imaging results with the patient and answered all related questions prior to discharge.    Impression & Plan      Medical Decision Making:  Caesar Hernandez is a 39 year old male presented to the Emergency Department with a complaint of chest pain. Fortunately the workup in the ED has been unremarkable and at this time I am not concerned for ACS. The EKG shows no signs of ischemia or infract. The troponin is negative, and the patient's HEART Score is 1. Given these findings, he is low risk for a  major cardiac event at 6 weeks.     I considered other possible causes of chest pain including PE, infection, pneumothorax, aortic dissection, and even more benign causes such as reflux and esophageal motility issues. The physical exam, laboratory, and radiological findings listed above make life threatening conditions less likely. At this time I believe the patient is stable for discharge. I have encouraged close Primary Care Physician follow up.  Anticipatory guidance given prior to discharge. Stress test ordered for outpatient.     Diagnosis:    ICD-10-CM    1. Chest pain, unspecified type  R07.9 Echocardiogram Exercise Stress     Disposition:   Findings and plan explained to the Patient. Patient discharged home with instructions regarding supportive care, medications, and reasons to return. The importance of close follow-up was reviewed.     Scribe Disclosure:  I, Fidelina Anderson, am serving as a scribe at 1:34 AM on 5/26/2020 to document services personally performed by Dhara Hernandez MD based on my observations and the provider's statements to me.    [facility] EMERGENCY DEPARTMENT       Dhara Hernandez MD  05/27/20 0426

## 2020-05-26 NOTE — ED NOTES
"Patient has \"high anxiety when it comes to needle pokes\". Patient requiring extra preparation time before any interventions completed per his request. Delayed blood work due to this issue. No IV access after 3 unsuccessful attempts  "

## 2020-05-26 NOTE — ED TRIAGE NOTES
"Pt reports intermittent chest \"twinges\" for the last couple days, says he was lying in bed on his phone and L hand started to feel tingly and weak. ABCs intact, A/O x4. Pt reports a hx of heart disease and thrombocytopenia.   "

## 2020-05-26 NOTE — ED AVS SNAPSHOT
[facility] Emergency Department  [address]  [facility] [address]  Phone:  [phone]  Fax:  [phone]                                    Caesar Hernandez   MRN: [id number]    Department:  [facility] Emergency Department   Date of Visit:  5/26/2020           After Visit Summary Signature Page    I have received my discharge instructions, and my questions have been answered. I have discussed any challenges I see with this plan with the nurse or doctor.    ..........................................................................................................................................  Patient/Patient Representative Signature      ..........................................................................................................................................  Patient Representative Print Name and Relationship to Patient    ..................................................               ................................................  Date                                   Time    ..........................................................................................................................................  Reviewed by Signature/Title    ...................................................              ..............................................  Date                                               Time          22EPIC Rev 08/18

## 2020-06-01 ENCOUNTER — HOSPITAL ENCOUNTER (OUTPATIENT)
Dept: CARDIOLOGY | Facility: CLINIC | Age: 40
Discharge: HOME | End: 2020-06-01
Attending: EMERGENCY MEDICINE | Admitting: EMERGENCY MEDICINE
Payer: COMMERCIAL

## 2020-06-01 DIAGNOSIS — R07.9 CHEST PAIN, UNSPECIFIED TYPE: ICD-10-CM

## 2020-06-01 PROCEDURE — 93321 DOPPLER ECHO F-UP/LMTD STD: CPT | Mod: 26 | Performed by: INTERNAL MEDICINE

## 2020-06-01 PROCEDURE — 25500064 ZZH RX 255 OP 636: Performed by: EMERGENCY MEDICINE

## 2020-06-01 PROCEDURE — 93018 CV STRESS TEST I&R ONLY: CPT | Performed by: INTERNAL MEDICINE

## 2020-06-01 PROCEDURE — 93350 STRESS TTE ONLY: CPT | Mod: 26 | Performed by: INTERNAL MEDICINE

## 2020-06-01 PROCEDURE — 93016 CV STRESS TEST SUPVJ ONLY: CPT | Performed by: INTERNAL MEDICINE

## 2020-06-01 PROCEDURE — 93325 DOPPLER ECHO COLOR FLOW MAPG: CPT | Mod: 26 | Performed by: INTERNAL MEDICINE

## 2020-06-01 PROCEDURE — 40000264 ECHO STRESS ECHOCARDIOGRAM

## 2020-06-01 RX ADMIN — HUMAN ALBUMIN MICROSPHERES AND PERFLUTREN 4 ML: 10; .22 INJECTION, SOLUTION INTRAVENOUS at 08:32

## 2020-06-02 ENCOUNTER — OFFICE VISIT (OUTPATIENT)
Dept: FAMILY MEDICINE | Facility: CLINIC | Age: 40
End: 2020-06-02

## 2020-06-02 VITALS
WEIGHT: 179.6 LBS | RESPIRATION RATE: 20 BRPM | SYSTOLIC BLOOD PRESSURE: 108 MMHG | DIASTOLIC BLOOD PRESSURE: 72 MMHG | TEMPERATURE: 97.7 F | BODY MASS INDEX: 28.99 KG/M2

## 2020-06-02 DIAGNOSIS — R07.9 CHEST PAIN, UNSPECIFIED TYPE: Primary | ICD-10-CM

## 2020-06-02 PROCEDURE — 99212 OFFICE O/P EST SF 10 MIN: CPT | Performed by: FAMILY MEDICINE

## 2020-06-02 NOTE — NURSING NOTE
Caesar Hernandez is here for a consult for his echo results.    Questioned patient about current smoking habits.  Pt. has never smoked.  PULSE regular  My Chart: active  CLASSIFICATION OF OVERWEIGHT AND OBESITY BY BMI                        Obesity Class           BMI(kg/m2)  Underweight                                    < 18.5  Normal                                         18.5-24.9  Overweight                                     25.0-29.9  OBESITY                     I                  30.0-34.9                             II                 35.0-39.9  EXTREME OBESITY             III                >40                            Patient's  BMI Body mass index is 28.99 kg/m .  http://hin.nhlbi.nih.gov/menuplanner/menu.cgi  Pre-visit planning  Immunizations - up to date  Colonoscopy -   Mammogram -   Asthma -   PHQ9 -    CHARITY-7 -

## 2020-06-02 NOTE — PROGRESS NOTES
Subjective     Caesar Hernandez is a 39 year old male who presents to clinic today for the following health issues:    HPI   ED/UC Followup:    Facility:  [facility]  Date of visit: 5/26/20  Reason for visit: chest pain-had neg cardiac w/u-had stress echo yesterday  Current Status: resolved issues            Patient Active Problem List   Diagnosis     TAR syndrome: thrombocytopenia and absent radius     Family history of ischemic heart disease     Thrombocytopenia (H)     Health Care Home     ACP (advance care planning)     Foot deformity, congenital     Vitiligo     GERD (gastroesophageal reflux disease)     Lipoma of skin and subcutaneous tissue     Past Surgical History:   Procedure Laterality Date     EXTRACTION(S) DENTAL Bilateral 6/24/2015    Procedure: EXTRACTION(S) DENTAL;  Surgeon: Selam Castillo DMD;  Location: [facility] OR     HERNIA REPAIR  2002     TESTICLE SURGERY  1987    undescended on right       Social History     Tobacco Use     Smoking status: Never Smoker     Smokeless tobacco: Never Used   Substance Use Topics     Alcohol use: Yes     Comment: rare     Family History   Problem Relation Age of Onset     Heart Disease Maternal Grandfather      Chemical Addiction Maternal Grandfather      Heart Disease Paternal Grandfather      Diabetes No family hx of          Current Outpatient Medications   Medication Sig Dispense Refill     Omeprazole (PRILOSEC PO) Take 20 mg by mouth every morning       Allergies   Allergen Reactions     Aspirin      Nsaids Unknown     platelet abnormality     Recent Labs   Lab Test 05/26/20  0236 07/18/17  1034 05/18/15  1055  03/21/14  1004   LDL  --  141* 111  --  139*   HDL  --  56 56  --  49   TRIG  --  126 93  --  96   ALT  --  126* 62  --   --    CR 0.93 1.08 0.98   < >  --    GFRESTIMATED >90 77 87   < >  --    GFRESTBLACK >90 >90   GFR Calc   >90   GFR Calc     < >  --    POTASSIUM 3.9 4.4 3.7   < >  --     < > = values in this interval not  displayed.      BP Readings from Last 3 Encounters:   06/02/20 108/72   05/26/20 119/76   12/04/19 104/60    Wt Readings from Last 3 Encounters:   06/02/20 81.5 kg (179 lb 9.6 oz)   12/04/19 86.2 kg (190 lb)   07/26/19 85.6 kg (188 lb 12.8 oz)                      Reviewed and updated as needed this visit by Provider         Review of Systems   Constitutional, HEENT, cardiovascular, pulmonary, gi and gu systems are negative, except as otherwise noted.      Objective    /72 (BP Location: Right arm, Patient Position: Chair)   Temp 97.7  F (36.5  C) (Oral)   Resp 20   Wt 81.5 kg (179 lb 9.6 oz)   BMI 28.99 kg/m    Body mass index is 28.99 kg/m .  Physical Exam   GENERAL: healthy, alert and no distress  NECK: no adenopathy, no asymmetry, masses, or scars and thyroid normal to palpation  RESP: lungs clear to auscultation - no rales, rhonchi or wheezes  CV: regular rate and rhythm, normal S1 S2, no S3 or S4, no murmur, click or rub, no peripheral edema and peripheral pulses strong  ABDOMEN: soft, nontender, no hepatosplenomegaly, no masses and bowel sounds normal  MS: no gross musculoskeletal defects noted, no edema    Diagnostic Test Results:  Labs reviewed in Epic        Assessment & Plan   Assessment      Plan  (R07.9) Chest pain, unspecified type  (primary encounter diagnosis)  Comment: resolved, essentially normal stress echo, HEART score 1 only for FH  Plan: no further tests recommended unless symptoms change, recur    patient given instructions to go to emergency department immediately if worsening of symptoms and verbalizes this understanding       Regular exercise    No follow-ups on file.    Jose Vicente MD  [facility]

## 2020-09-02 ENCOUNTER — OFFICE VISIT (OUTPATIENT)
Dept: FAMILY MEDICINE | Facility: CLINIC | Age: 40
End: 2020-09-02
Payer: COMMERCIAL

## 2020-09-02 VITALS
BODY MASS INDEX: 29.09 KG/M2 | HEIGHT: 66 IN | SYSTOLIC BLOOD PRESSURE: 118 MMHG | WEIGHT: 181 LBS | DIASTOLIC BLOOD PRESSURE: 74 MMHG | TEMPERATURE: 98.7 F

## 2020-09-02 DIAGNOSIS — L30.9 DERMATITIS: Primary | ICD-10-CM

## 2020-09-02 PROCEDURE — 99213 OFFICE O/P EST LOW 20 MIN: CPT | Performed by: FAMILY MEDICINE

## 2020-09-02 ASSESSMENT — MIFFLIN-ST. JEOR: SCORE: 1678.76

## 2020-09-02 NOTE — PATIENT INSTRUCTIONS
Oral antihistamines- claritin, zyrtec, or allegra.    Benadryl, 1-2 tablets at bedtime as needed.    Topical benadryl, calamine lotion.

## 2020-09-02 NOTE — PROGRESS NOTES
"Subjective     Caesar Hernandez is a 39 year old male who presents to clinic today for the following health issues:    HPI       Concern - rash/itching  Onset: x 1 week  Description: ankles, legs, feet  Intensity: moderate  Progression of Symptoms:  worsening  Accompanying Signs & Symptoms: quarter size red spots itching  Previous history of similar problem: none  Therapies tried and outcome: hydrocortisone      Itching on skin, unsure if he has bug bites but has discrete spots on legs that itch.  Has not been outside much other than mowing lawn on a riding mower.  Last week Sunday was outside for about 30 minutes, but not an extended period of time.  Hydrocortisone is not helping.  Has not tried any oral medications/antihistamines.  No foods, detergent changes, etc.  No spots on arm, body, face.  Wife does not have anything on her body either.   No other concerns.        Health Maintenance Due   Topic Date Due     ANNUAL REVIEW OF HM ORDERS  1980     HIV SCREENING  10/30/1995     PREVENTIVE CARE VISIT  07/18/2018     PHQ-2  01/01/2020     INFLUENZA VACCINE (1) 09/01/2020     Health Maintenance addressed:  Flu Vaccine    Flu Vaccine Pt declined    MyChart Status:  Active and Using          Review of Systems   Constitutional, HEENT, cardiovascular, pulmonary, gi and gu systems are negative, except as otherwise noted.      Objective    /74 (BP Location: Right arm, Patient Position: Chair, Cuff Size: Adult Regular)   Temp 98.7  F (37.1  C) (Oral)   Ht 1.676 m (5' 6\")   Wt 82.1 kg (181 lb)   BMI 29.21 kg/m    Body mass index is 29.21 kg/m .  Physical Exam   GENERAL: healthy, alert and no distress  SKIN: Multiple scattered erythematous papules, raised lesions with a few up to ~1.5 cm.  Not warm to touch. Located on bilateral lower legs and top of feet, remainder of body without lesion.          Assessment & Plan     Dermatitis  Non-specific pruritic dermatitis, looks like it could be bug bites.  Will recommend " OTC Antihistamine PRN for next few days, may try topical benadryl cream and calamine lotion.  If not improving in a few days, may call and I can send in rx for Triamcinolone ointment.  Follow up as needed.           Patient Instructions   Oral antihistamines- claritin, zyrtec, or allegra.    Benadryl, 1-2 tablets at bedtime as needed.    Topical benadryl, calamine lotion.        Return if symptoms worsen or fail to improve.    Bebe Sims MD  [facility]

## 2020-09-28 ENCOUNTER — OFFICE VISIT (OUTPATIENT)
Dept: FAMILY MEDICINE | Facility: CLINIC | Age: 40
End: 2020-09-28

## 2020-09-28 DIAGNOSIS — D69.42 TAR SYNDROME (H): Primary | ICD-10-CM

## 2020-09-28 DIAGNOSIS — Q87.2 TAR SYNDROME (H): Primary | ICD-10-CM

## 2020-09-28 PROCEDURE — 99213 OFFICE O/P EST LOW 20 MIN: CPT | Mod: GT | Performed by: FAMILY MEDICINE

## 2020-09-28 NOTE — PROGRESS NOTES
" This visit is being conducted as a virtual visit due to the emphasis on mitigation of the COVID-19 virus pandemic. The clinician has decided that the risk of an in-office visit outweighs the benefit for this patient.      The patient has been notified of following:   \"This telemed visit will be conducted via a virtual communication between you and your physician/provider using Zoom. We have found that certain health care needs can be provided without the need for a physical exam.  This service lets us provide the care you need with a virtual visit  If a prescription is necessary we can send it directly to your pharmacy.  If lab work is needed we can place an order for that and you can then stop by our lab to have the test done at a later time.     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 He has TAR which includes thrombocytopenia.  PT is concerned he would be in trouble if he were to get covid so not sure what to do.    Medical, social, surgical, and family histories reviewed.    Patient Active Problem List   Diagnosis     TAR syndrome: thrombocytopenia and absent radius     Family history of ischemic heart disease     Thrombocytopenia (H)     Health Care Home     ACP (advance care planning)     Foot deformity, congenital     Vitiligo     GERD (gastroesophageal reflux disease)     Lipoma of skin and subcutaneous tissue     Past Medical History:   Diagnosis Date     Coagulation disorder (H)      Complication of anesthesia      Difficult intubation     See anesthesia record from 5/10/2019     Exercise-induced asthma 3/20/2014     Gastro-oesophageal reflux disease      Heart murmur      Other chronic pain     foot pain bilat     Family History   Problem Relation Age of Onset     Heart Disease Maternal Grandfather "      Chemical Addiction Maternal Grandfather      Heart Disease Paternal Grandfather      Diabetes No family hx of      Social History     Socioeconomic History     Marital status:      Spouse name: reny     Number of children: 2     Years of education: Not on file     Highest education level: Not on file   Occupational History     Occupation: teacher     Comment: Distant   Social Needs     Financial resource strain: Not on file     Food insecurity     Worry: Not on file     Inability: Not on file     Transportation needs     Medical: Not on file     Non-medical: Not on file   Tobacco Use     Smoking status: Never Smoker     Smokeless tobacco: Never Used   Substance and Sexual Activity     Alcohol use: Yes     Comment: rare     Drug use: No     Sexual activity: Yes     Partners: Female   Lifestyle     Physical activity     Days per week: Not on file     Minutes per session: Not on file     Stress: Not on file   Relationships     Social connections     Talks on phone: Not on file     Gets together: Not on file     Attends Latter day service: Not on file     Active member of club or organization: Not on file     Attends meetings of clubs or organizations: Not on file     Relationship status: Not on file     Intimate partner violence     Fear of current or ex partner: Not on file     Emotionally abused: Not on file     Physically abused: Not on file     Forced sexual activity: Not on file   Other Topics Concern     Parent/sibling w/ CABG, MI or angioplasty before 65F 55M? Not Asked   Social History Narrative     Not on file     Past Surgical History:   Procedure Laterality Date     EXTRACTION(S) DENTAL Bilateral 6/24/2015    Procedure: EXTRACTION(S) DENTAL;  Surgeon: Selam Castillo DMD;  Location: [facility] OR     HERNIA REPAIR  2002     TESTICLE SURGERY  1987    undescended on right     Omeprazole (PRILOSEC PO), Take 20 mg by mouth every morning    No current facility-administered medications on file prior  to visit.        Allergies: Aspirin and Nsaids    Immunization History   Administered Date(s) Administered     Influenza Vaccine IM > 6 months Valent IIV4 01/23/2018     TD (ADULT, 7+) 08/09/2004     TDAP Vaccine (Adacel) 03/21/2014        ROS:  CONSTITUTIONAL: NEGATIVE for fever, chills  RESP: NEGATIVE for significant cough or SOB  CV: NEGATIVE for chest pain, palpitations   GI: NEGATIVE for nausea, abdominal pain, heartburn, or change in bowel habits    OBJECTIVE:  There were no vitals taken for this visit.  EXAM:  Gen- alert, NAD, cheerful, mentation , judgement and insight intact.  Well groomed.      ASSESSMENT/PLAN:  (Q87.2) TAR syndrome: thrombocytopenia and absent radius  (primary encounter diagnosis)  Comment: we had a long discussion about risks-what we do and don't know-do not know that mild thrombocytopenia is a specific risk with covid but certainly it needs to be considered-given that jow is still distance for now I would feel it is fine, if in person would recommend mask and shield at all times, he understands   Plan: ok for work if proper PPE    *15 minute visit, all in coumseling/discussion*

## 2021-01-15 ENCOUNTER — HEALTH MAINTENANCE LETTER (OUTPATIENT)
Age: 41
End: 2021-01-15

## 2021-02-17 ENCOUNTER — TELEPHONE (OUTPATIENT)
Dept: FAMILY MEDICINE | Facility: CLINIC | Age: 41
End: 2021-02-17

## 2021-02-17 NOTE — TELEPHONE ENCOUNTER
I am not aware of any contraindication to vaccine for thrombocytopenia-in fact, the illness can cause thrombocytopenia in rare cases.  I would definitely recommend getting vaccine

## 2021-02-17 NOTE — TELEPHONE ENCOUNTER
Pt called and would like advise if he should get the covid vaccine tomorrow because of his thrombocytopenia. Please advise .    Pt # [phone]

## 2021-02-25 NOTE — TELEPHONE ENCOUNTER
Pt called back to ask if he should get the pfizer or moderna, informed pt that they are similar and either one would be okay.

## 2021-06-30 ENCOUNTER — OFFICE VISIT (OUTPATIENT)
Dept: FAMILY MEDICINE | Facility: CLINIC | Age: 41
End: 2021-06-30

## 2021-06-30 VITALS
HEIGHT: 66 IN | OXYGEN SATURATION: 97 % | TEMPERATURE: 97.9 F | HEART RATE: 87 BPM | SYSTOLIC BLOOD PRESSURE: 120 MMHG | BODY MASS INDEX: 32.14 KG/M2 | WEIGHT: 200 LBS | DIASTOLIC BLOOD PRESSURE: 70 MMHG

## 2021-06-30 DIAGNOSIS — R05.9 COUGH: Primary | ICD-10-CM

## 2021-06-30 PROCEDURE — 71046 X-RAY EXAM CHEST 2 VIEWS: CPT | Performed by: FAMILY MEDICINE

## 2021-06-30 PROCEDURE — 99213 OFFICE O/P EST LOW 20 MIN: CPT | Performed by: FAMILY MEDICINE

## 2021-06-30 ASSESSMENT — MIFFLIN-ST. JEOR: SCORE: 1759.94

## 2021-06-30 NOTE — PATIENT INSTRUCTIONS
"Assessment & Plan   Problem List Items Addressed This Visit     None      Visit Diagnoses     Cough    -  Primary    Relevant Orders    XR Chest 2 Views         1. Cough  Likely residual from a viral infection. Chest xray done today. followup in a month if continued cough. Sooner if any changes.  - XR Chest 2 Views           BMI:   Estimated body mass index is 32.28 kg/m  as calculated from the following:    Height as of this encounter: 1.676 m (5' 6\").    Weight as of this encounter: 90.7 kg (200 lb).   Weight management plan: Discussed healthy diet and exercise guidelines      FUTURE APPOINTMENTS:       - Follow-up visit in 1 month    No follow-ups on file.    Mayra Parson MD  [facility]  "

## 2021-06-30 NOTE — PROGRESS NOTES
"Assessment & Plan   Problem List Items Addressed This Visit     None      Visit Diagnoses     Cough    -  Primary    Relevant Orders    XR Chest 2 Views         1. Cough  Likely residual from a viral infection. Chest xray done today. followup in a month if continued cough. Sooner if any changes.  - XR Chest 2 Views           BMI:   Estimated body mass index is 32.28 kg/m  as calculated from the following:    Height as of this encounter: 1.676 m (5' 6\").    Weight as of this encounter: 90.7 kg (200 lb).   Weight management plan: Discussed healthy diet and exercise guidelines      FUTURE APPOINTMENTS:       - Follow-up visit in 1 month    No follow-ups on file.    Mayra Parson MD  [facility]    Subjective     Nursing Notes:   Reena Linares, CHANTELLE  6/30/2021  4:40 PM  Signed  Caesar is here for a cough since June 4th, worse when talking for long periods.           Caesar Hernandez is a 40 year old male who presents to clinic today for the following health issues   HPI     Cough for a month, on and off. No fevers, no chills, vaccinated for covid. No other sick contacts.   initally it hurt with a cough but this is better now. Not productive. Mostly with sings--when with the kids and if talking a lot. Could be during the day. Hasn't woken him  Up from sleep.  Has GERD.   Has some postnasal drainage, not sure if this contributes. Because the cough is dry.    Review of Systems   Constitutional, HEENT, cardiovascular, pulmonary, gi and gu systems are negative, except as otherwise noted.      Objective    /70 (BP Location: Left arm, Patient Position: Sitting, Cuff Size: Adult Large)   Pulse 87   Temp 97.9  F (36.6  C)   Ht 1.676 m (5' 6\")   Wt 90.7 kg (200 lb)   SpO2 97%   BMI 32.28 kg/m    Body mass index is 32.28 kg/m .  Physical Exam   GENERAL: healthy, alert and no distress  RESP: lungs clear to auscultation - no rales, rhonchi or wheezes  CV: regular rate and rhythm, normal S1 S2, no S3 or " S4, no murmur, click or rub, no peripheral edema and peripheral pulses strong  MS: no gross musculoskeletal defects noted, no edema  NEURO: Normal strength and tone, mentation intact and speech normal  PSYCH: mentation appears normal, affect normal/bright  Bilateral upper ex deformity    CXR - Reviewed and interpreted by me Normal- no infiltrates, effusions, pneumothoraces, cardiomegaly or masses

## 2021-08-30 ENCOUNTER — TELEPHONE (OUTPATIENT)
Dept: FAMILY MEDICINE | Facility: CLINIC | Age: 41
End: 2021-08-30

## 2021-08-30 NOTE — TELEPHONE ENCOUNTER
patient calling to ask about hep B vaccine status- advised we do not have that listed.  Advised to speak with parents about vaccine records or he would need to:  Get titer or the series.  Patient verbalized understanding    Julia SAHNIRN BSN  [facility] [place]  [phone]

## 2021-10-24 ENCOUNTER — HEALTH MAINTENANCE LETTER (OUTPATIENT)
Age: 41
End: 2021-10-24

## 2021-11-08 ENCOUNTER — HOSPITAL ENCOUNTER (EMERGENCY)
Facility: CLINIC | Age: 41
Discharge: HOME | End: 2021-11-09
Attending: EMERGENCY MEDICINE | Admitting: EMERGENCY MEDICINE
Payer: COMMERCIAL

## 2021-11-08 DIAGNOSIS — D69.6 THROMBOCYTOPENIA (H): ICD-10-CM

## 2021-11-08 DIAGNOSIS — U07.1 INFECTION DUE TO 2019 NOVEL CORONAVIRUS: ICD-10-CM

## 2021-11-08 PROCEDURE — 99283 EMERGENCY DEPT VISIT LOW MDM: CPT

## 2021-11-09 ENCOUNTER — TELEPHONE (OUTPATIENT)
Dept: FAMILY MEDICINE | Facility: CLINIC | Age: 41
End: 2021-11-09

## 2021-11-09 VITALS
OXYGEN SATURATION: 98 % | HEART RATE: 86 BPM | RESPIRATION RATE: 22 BRPM | TEMPERATURE: 99.1 F | WEIGHT: 185.63 LBS | BODY MASS INDEX: 29.96 KG/M2 | SYSTOLIC BLOOD PRESSURE: 139 MMHG | DIASTOLIC BLOOD PRESSURE: 124 MMHG

## 2021-11-09 LAB
BASOPHILS # BLD AUTO: 0.1 10E3/UL (ref 0–0.2)
BASOPHILS NFR BLD AUTO: 1 %
EOSINOPHIL # BLD AUTO: 0.5 10E3/UL (ref 0–0.7)
EOSINOPHIL NFR BLD AUTO: 7 %
ERYTHROCYTE [DISTWIDTH] IN BLOOD BY AUTOMATED COUNT: 15.3 % (ref 10–15)
HCT VFR BLD AUTO: 47.4 % (ref 40–53)
HGB BLD-MCNC: 15.6 G/DL (ref 13.3–17.7)
HOLD SPECIMEN: NORMAL
IMM GRANULOCYTES # BLD: 0 10E3/UL
IMM GRANULOCYTES NFR BLD: 0 %
LYMPHOCYTES # BLD AUTO: 2.2 10E3/UL (ref 0.8–5.3)
LYMPHOCYTES NFR BLD AUTO: 31 %
MCH RBC QN AUTO: 30.3 PG (ref 26.5–33)
MCHC RBC AUTO-ENTMCNC: 32.9 G/DL (ref 31.5–36.5)
MCV RBC AUTO: 92 FL (ref 78–100)
MONOCYTES # BLD AUTO: 0.6 10E3/UL (ref 0–1.3)
MONOCYTES NFR BLD AUTO: 9 %
NEUTROPHILS # BLD AUTO: 3.7 10E3/UL (ref 1.6–8.3)
NEUTROPHILS NFR BLD AUTO: 52 %
NRBC # BLD AUTO: 0 10E3/UL
NRBC BLD AUTO-RTO: 0 /100
PLAT MORPH BLD: NORMAL
PLATELET # BLD AUTO: 55 10E3/UL (ref 150–450)
RBC # BLD AUTO: 5.15 10E6/UL (ref 4.4–5.9)
RBC MORPH BLD: NORMAL
WBC # BLD AUTO: 7.1 10E3/UL (ref 4–11)

## 2021-11-09 PROCEDURE — 36415 COLL VENOUS BLD VENIPUNCTURE: CPT | Performed by: EMERGENCY MEDICINE

## 2021-11-09 PROCEDURE — 85025 COMPLETE CBC W/AUTO DIFF WBC: CPT | Performed by: EMERGENCY MEDICINE

## 2021-11-09 NOTE — TELEPHONE ENCOUNTER
Caesar called to inform that he got tested for covid Saturday and found out yesterday that he is positive.   His O2 is 98% and pulse 103.  He wants to ask Dr. Vicente if he needs to take specific precaution aside from reccommended precautions due to his condition-- TAR syndrome: thrombocytopenia and absent radius      Please advise, thanks.      Pt phone [phone]

## 2021-11-09 NOTE — DISCHARGE INSTRUCTIONS
Discharge Instructions  COVID-19    COVID-19 is the disease caused by a new coronavirus. The virus spreads from person-to-person primarily by droplets when an infected person coughs or sneezes and the droplets are then breathed in by another person. There are tests available to diagnose COVID-19. You may have been diagnosed with COVID, may be being tested for COVID and have a pending test result, or may have been exposed to COVID.    Symptoms of COVID-19  Many people have no symptoms or mild symptoms.  Symptoms may usually appear 4 to 5 days (up to 14 days) after contact with a person with COVID-19. Some people will get severe symptoms and pneumonia. Usual symptoms are:     ? Fever  ? Cough  ? Trouble breathing    Less common symptoms are: Headache, body aches, sore throat, sneezing, diarrhea, loss of taste or smell.    Isolation and Quarantine    You may have been seen because you have symptoms, had an exposure, or had some other concern about possible COVID. The best way to stop the spread of the virus is to avoid contact with others.    Isolation refers to sick people staying away from people who are not sick. A person in quarantine is limiting activity because they were exposed and are waiting to see if they might become sick.    If you test positive for COVID, you should stay home (isolation) for at least 10 days after your symptoms began, and for 24 hours with no fever and improvement of symptoms--whichever is longer. (Your fever should be gone for 24 hours without using fever-reducing medicine). If you have no symptoms, you should stay home (isolation) for 10 days from the day of the test. If you have been vaccinated for COVID, the vaccination will not cause you to test positive so a positive test result generally is a  true positive .    For example, if you have a fever and cough for 6 days, you need to stay home 4 more days with no fever for a total of 10 days. Or, if you have a fever and cough for 10 days,  you need to stay home one more day with no fever for a total of 11 days.    If you have a high-risk exposure to COVID (you spent 15 minutes or more within six feet of somebody who has COVID), you should stay home (quarantine) for 14 days, unless you are vaccinated. Even if you test negative for COVID, the CDC recommends a 14-day quarantine from the time of your last exposure to that individual (unless you are vaccinated). There are options for a shortened (<14 day quarantine) you can review at:  [email or web address]://[email or web address]./diseases/coronavirus/close.html#long    If you live in the same house as somebody with COVID and cannot separate from them, you will need to quarantine for 14-days after that person's isolation (infectious) period. That means that you may need to quarantine for 24-days after that person became symptomatic/ill.    If you are vaccinated and do not develop symptoms, you do not need to quarantine after exposure.    If you have symptoms but a negative test, you should stay at home until you are symptom-free and without fever for 24 hours, using the same judgment you would for when it is safe to return to work/school from strep throat, influenza, or the common cold. If you worsen, you should consider being re-evaluated.    If you are being tested for COVID because of symptoms and your test is pending, you should stay home until you know your test result.    If I have COVID, how should I protect myself and others?    Do not go to work or school. Have a friend or relative do your shopping. Do not use public transportation (bus, train) or ridesharing (Lyft, Uber).    Separate yourself from other people in your home. As much as possible, you should stay in one room and away from other people in your home. Also, use a separate bathroom, if possible. Avoid handling pets or other animals while sick.     Wear a facemask if you need to be around other people and cover your mouth and nose with a tissue when  you cough or sneeze.     Avoid sharing personal household items. You should not share dishes, drinking glasses, forks/knives/spoons, towels, or bedding with other people in your home. After using these items, they should be washed with soap and water. Clean parts of your home that are touched often (doorknobs, faucets, countertops, etc.) daily.     Wash your hands often with soap and water for at least 20 seconds or use an alcohol-based hand  containing at least 60% alcohol.     Avoid touching your face.    Treat your symptoms. You can take Acetaminophen (Tylenol) to treat body aches and fever as needed for comfort. Ibuprofen (Advil or Motrin) can be used as well if you still have symptoms after taking Tylenol. Drink fluids. Rest.    Watch for worsening symptoms such as shortness of breath/difficulty breathing or very severe weakness.    Employers/workplaces are being asked by the Centers for Disease Control (CDC) to not request notes/documentation for you to return to work or prove that you were ill. You may choose to show your employer this paperwork. Also, repeat testing should not be required to return to work.    Exercise/Sports in rare cases, COVID could affect your heart in a way that makes exercise or participation in sports dangerous.    If you have a mild COVID illness (fever, cough, sore throat, and similar symptoms but no difficulty breathing or abnormalities of the lung): After your COVID symptoms have resolved, wait 14-days before returning to activity.  If you have more than a mild illness (meaning that you have problems with your breathing or lungs) or if you participate in competitive or strenuous activity or have a history of heart disease: Please see your primary doctor/provider prior to return to activity/competition.    Antibody treatments are available for patients with mild to moderate COVID illness in order to prevent severe illness. In general, only patients with risk factors for  severe illness are eligible for treatment. For more information, to see if you are eligible, and to find treatment, go to the [facility]:  [email or web address]/[email or web address]     Return to the Emergency Department if:    If you are developing worsening breathing, shortness of breath, or feel worse you should seek medical attention.  If you are uncertain, contact your health care provider/clinic. If you need emergency medical attention, call 911 and tell them you have been ill.

## 2021-11-09 NOTE — ED TRIAGE NOTES
Pt arrives to the ED due to headache x 3 days along with cough and fevers up to 101. Pt states had a positive COVID, swabbed on Saturday. Pt states h/o of thrombocytopenia and is worried about that with being positive for COVID.

## 2021-11-09 NOTE — ED PROVIDER NOTES
History     Chief Complaint:  Covid Concern     HPI   Caesar Hernandez is a 41 year old male who presents with fatigue and a positive Covid test.  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  The patient said he started to feel fatigue on Thursday which she associated with a long week.  Patient said he also had fevers and cough he was tested on Saturday and was told he had Covid.  The patient has a history of TAR syndrome and is concerned because he has associated thrombocytopenia.  The patient denies any significant shortness of breath and reports he is using a pulse oximeter at home without hypoxia.  Has no chest pain.    ROS:  Review of Systems     Allergies:  Aspirin  Nsaids     Medications:    Omeprazole    Past Medical History:    Coagulation disorder  Anesthesia complication  Difficult intubation  Asthma  GERD  Heart murmur  Chronic pain  TAR syndrome  Foot deformity, congenital  Lipoma  Vitiligo    Past Surgical History:    Dental extractions  Testicle surgery  Hernia repair    Social History:  Presents alone.  Presents via car.    Physical Exam     Patient Vitals for the past 24 hrs:   BP Temp Temp src Pulse Resp SpO2 Weight   11/09/21 0145 -- -- -- -- -- 98 % --   11/09/21 0115 -- -- -- -- -- 96 % --   11/09/21 0100 -- -- -- -- -- 96 % --   11/09/21 0045 -- -- -- 86 -- 98 % --   11/09/21 0030 -- -- -- 78 -- 97 % --   11/09/21 0000 (!) 139/124 -- -- 81 -- 98 % --   11/08/21 2345 (!) 118/106 -- -- -- -- -- --   11/08/21 1922 (!) 132/94 99.1  F (37.3  C) Oral 96 22 98 % 84.2 kg (185 lb 10 oz)        Physical Exam  General: The patient is alert, in no respiratory distress.    HENT: No facial swelling    Cardiovascular: Regular rate and rhythm.      Respiratory: Lungs are clear. No nasal flaring. No retractions. No wheezing, no crackles.    Gastrointestinal: Abdomen soft.    Musculoskeletal: Patient has short arms    Skin: No rashes or petechiae.     Neurologic: The patient is alert and is  conversant    Psychiatric: The patient is non-tearful.    Emergency Department Course     Laboratory:  Labs Ordered and Resulted from Time of ED Arrival to Time of ED Departure   CBC WITH PLATELETS AND DIFFERENTIAL - Abnormal       Result Value    WBC Count 7.1      RBC Count 5.15      Hemoglobin 15.6      Hematocrit 47.4      MCV 92      MCH 30.3      MCHC 32.9      RDW 15.3 (*)     Platelet Count 55 (*)     % Neutrophils 52      % Lymphocytes 31      % Monocytes 9      % Eosinophils 7      % Basophils 1      % Immature Granulocytes 0      NRBCs per 100 WBC 0      Absolute Neutrophils 3.7      Absolute Lymphocytes 2.2      Absolute Monocytes 0.6      Absolute Eosinophils 0.5      Absolute Basophils 0.1      Absolute Immature Granulocytes 0.0      Absolute NRBCs 0.0     RBC AND PLATELET MORPHOLOGY    Platelet Assessment        Value: Automated Count Confirmed. Platelet morphology is normal.    RBC Morphology Confirmed RBC Indices        Emergency Department Course:  Reviewed:  I reviewed nursing notes, vitals, past medical history, Care Everywhere and [facility]    Assessments:   I obtained history and examined the patient as noted above.    I rechecked the patient and explained findings.     Disposition:  The patient was discharged to home.     Impression & Plan      Medical Decision Making:  The patient is vaccinated against Covid but unfortunately has come down with Covid as well.  His symptoms are quite mild but he was concerned because he has underlying thrombocytopenia.  Covid can decrease the platelets.  Therefore did check his CBC here his platelets are about usual.  He shows no hypoxia.  He has no symptoms to suggest PE and I did not feel he required steroids or remdesivir.  The patient was told to monitor his oxygen saturations and return if any problems he was discharged home in good condition.    Diagnosis:    ICD-10-CM    1. Infection due to 2019 novel coronavirus  U07.1    2. Thrombocytopenia (H)  D69.6            11/8/2021   Moisés Boykin MD Farnan, Christopher M, MD  11/09/21 0438

## 2021-11-09 NOTE — TELEPHONE ENCOUNTER
I saw pt was in ED-- platelet count stable so as long as O2 sats good no further intervention needed, go to ER if breathing difficulty

## 2021-11-15 ENCOUNTER — OFFICE VISIT (OUTPATIENT)
Dept: FAMILY MEDICINE | Facility: CLINIC | Age: 41
End: 2021-11-15

## 2021-11-15 VITALS
BODY MASS INDEX: 31.72 KG/M2 | DIASTOLIC BLOOD PRESSURE: 72 MMHG | RESPIRATION RATE: 20 BRPM | TEMPERATURE: 97.2 F | OXYGEN SATURATION: 96 % | SYSTOLIC BLOOD PRESSURE: 128 MMHG | HEIGHT: 65 IN | WEIGHT: 190.4 LBS | HEART RATE: 92 BPM

## 2021-11-15 DIAGNOSIS — Q87.2 TAR SYNDROME (H): ICD-10-CM

## 2021-11-15 DIAGNOSIS — D69.42 TAR SYNDROME (H): ICD-10-CM

## 2021-11-15 DIAGNOSIS — U07.1 INFECTION DUE TO 2019 NOVEL CORONAVIRUS: Primary | ICD-10-CM

## 2021-11-15 PROCEDURE — 99213 OFFICE O/P EST LOW 20 MIN: CPT | Performed by: FAMILY MEDICINE

## 2021-11-15 ASSESSMENT — MIFFLIN-ST. JEOR: SCORE: 1695.53

## 2021-11-15 NOTE — LETTER
[facility]  [address], [address]  [place]  [address]    November 15, 2021        RE: Caesar CERRATO Mary  [address] [facility] [address]              To Whom It May Concern,   The above named patient was seen at this clinic for illness follow up today. Please excuse any absence.  He may return to work without  Restriction 11/17/21.        CLAUDINE Vicente M.D.      If you have any further questions or problems, please contact our office at [phone].

## 2021-11-15 NOTE — PROGRESS NOTES
"  Assessment & Plan     Infection due to 2019 novel coronavirus  Doing well, no current symptoms , ok to return to work Wednesday as planned    TAR syndrome: thrombocytopenia and absent radius  Stable per ED labs      Review of external notes as documented elsewhere in note           No follow-ups on file.    Jose Vicente MD  [facility]    Cristino Maynard is a 41 year old who presents for the following health issues     HPI     ED/UC Followup:    Facility:  [facility]  Date of visit: 11/8/21, tested positive on 11/6/21, symptoms started 11/4/21, was able to get monoclonal ab treatment 11/13/21  Reason for visit: COVID-and known TAR syndrome-PLT stable  Current Status: doing well, mainly just fatigue, no fevers, no respiratory issues           Review of Systems   Constitutional, HEENT, cardiovascular, pulmonary, gi and gu systems are negative, except as otherwise noted.      Objective    There were no vitals taken for this visit.   /72 (BP Location: Right arm, Patient Position: Chair, Cuff Size: Adult Regular)   Pulse 92   Temp 97.2  F (36.2  C)   Resp 20   Ht 1.651 m (5' 5\")   Wt 86.4 kg (190 lb 6.4 oz)   SpO2 96%   BMI 31.68 kg/m     There is no height or weight on file to calculate BMI.  Physical Exam   GENERAL: healthy, alert and no distress  NECK: no adenopathy, no asymmetry, masses, or scars and thyroid normal to palpation  RESP: lungs clear to auscultation - no rales, rhonchi or wheezes  CV: regular rate and rhythm, normal S1 S2, no S3 or S4, no murmur, click or rub, no peripheral edema and peripheral pulses strong  MS: no gross musculoskeletal defects noted, no edema    Admission on 11/08/2021, Discharged on 11/09/2021   Component Date Value Ref Range Status     WBC Count 11/09/2021 7.1  4.0 - 11.0 10e3/uL Final     RBC Count 11/09/2021 5.15  4.40 - 5.90 10e6/uL Final     Hemoglobin 11/09/2021 15.6  13.3 - 17.7 g/dL Final     Hematocrit 11/09/2021 47.4  40.0 - 53.0 % " Final     MCV 11/09/2021 92  78 - 100 fL Final     MCH 11/09/2021 30.3  26.5 - 33.0 pg Final     MCHC 11/09/2021 32.9  31.5 - 36.5 g/dL Final     RDW 11/09/2021 15.3* 10.0 - 15.0 % Final     Platelet Count 11/09/2021 55* 150 - 450 10e3/uL Final     % Neutrophils 11/09/2021 52  % Final     % Lymphocytes 11/09/2021 31  % Final     % Monocytes 11/09/2021 9  % Final     % Eosinophils 11/09/2021 7  % Final     % Basophils 11/09/2021 1  % Final     % Immature Granulocytes 11/09/2021 0  % Final     NRBCs per 100 WBC 11/09/2021 0  <1 /100 Final     Absolute Neutrophils 11/09/2021 3.7  1.6 - 8.3 10e3/uL Final     Absolute Lymphocytes 11/09/2021 2.2  0.8 - 5.3 10e3/uL Final     Absolute Monocytes 11/09/2021 0.6  0.0 - 1.3 10e3/uL Final     Absolute Eosinophils 11/09/2021 0.5  0.0 - 0.7 10e3/uL Final     Absolute Basophils 11/09/2021 0.1  0.0 - 0.2 10e3/uL Final     Absolute Immature Granulocytes 11/09/2021 0.0  <=0.0 10e3/uL Final     Absolute NRBCs 11/09/2021 0.0  10e3/uL Final     Hold Specimen 11/09/2021 [facility]   Final     Platelet Assessment 11/09/2021 Automated Count Confirmed. Platelet morphology is normal.  Automated Count Confirmed. Platelet morphology is normal. Final     RBC Morphology 11/09/2021 Confirmed RBC Indices   Final

## 2021-11-15 NOTE — NURSING NOTE
Caesar Hernandez is here for ER recheck after having COVID.    Questioned patient about current smoking habits.  Pt. has never smoked.  PULSE regular  My Chart: active  CLASSIFICATION OF OVERWEIGHT AND OBESITY BY BMI                        Obesity Class           BMI(kg/m2)  Underweight                                    < 18.5  Normal                                         18.5-24.9  Overweight                                     25.0-29.9  OBESITY                     I                  30.0-34.9                             II                 35.0-39.9  EXTREME OBESITY             III                >40                            Patient's  BMI Body mass index is 31.68 kg/m .  http://hin.nhlbi.nih.gov/menuplanner/menu.cgi  Pre-visit planning  Immunizations - up to date  Colonoscopy -   Mammogram -   Asthma -   PHQ9 -    CHARITY-7 -    Hearing Test -

## 2022-01-06 ENCOUNTER — OFFICE VISIT (OUTPATIENT)
Dept: FAMILY MEDICINE | Facility: CLINIC | Age: 42
End: 2022-01-06

## 2022-01-06 VITALS
OXYGEN SATURATION: 96 % | WEIGHT: 190 LBS | BODY MASS INDEX: 31.65 KG/M2 | HEART RATE: 68 BPM | SYSTOLIC BLOOD PRESSURE: 122 MMHG | DIASTOLIC BLOOD PRESSURE: 82 MMHG | TEMPERATURE: 98.6 F | HEIGHT: 65 IN

## 2022-01-06 DIAGNOSIS — R50.9 FEBRILE ILLNESS, ACUTE: ICD-10-CM

## 2022-01-06 DIAGNOSIS — R05.9 COUGH: Primary | ICD-10-CM

## 2022-01-06 LAB
FLUAV AG UPPER RESP QL IA.RAPID: NORMAL
FLUBV AG UPPER RESP QL IA.RAPID: NORMAL

## 2022-01-06 PROCEDURE — 99214 OFFICE O/P EST MOD 30 MIN: CPT | Performed by: STUDENT IN AN ORGANIZED HEALTH CARE EDUCATION/TRAINING PROGRAM

## 2022-01-06 PROCEDURE — 87804 INFLUENZA ASSAY W/OPTIC: CPT | Performed by: STUDENT IN AN ORGANIZED HEALTH CARE EDUCATION/TRAINING PROGRAM

## 2022-01-06 PROCEDURE — G2023 SPECIMEN COLLECT COVID-19: HCPCS | Performed by: STUDENT IN AN ORGANIZED HEALTH CARE EDUCATION/TRAINING PROGRAM

## 2022-01-06 RX ORDER — CODEINE PHOSPHATE AND GUAIFENESIN 10; 100 MG/5ML; MG/5ML
1-2 SOLUTION ORAL
Qty: 118 ML | Refills: 0 | Status: SHIPPED | OUTPATIENT
Start: 2022-01-06 | End: 2022-08-24

## 2022-01-06 ASSESSMENT — MIFFLIN-ST. JEOR: SCORE: 1693.71

## 2022-01-06 NOTE — NURSING NOTE
Chief Complaint   Patient presents with     Covid Concern     12/27 started not feeling wel with GI issues such as diarrhea, dry cough started on tuesday, fever of 102.7, slight body aches and chills, had COVID 11/08     Pre-visit Screening:  Immunizations:  up to date  Colonoscopy:  NA  Mammogram: NA  Asthma Action Test/Plan:  NA  PHQ9:  NA  GAD7:  NA  Questioned patient about current smoking habits Pt. has never smoked.  Ok to leave detailed message on voice mail for today's visit only Yes, phone # [phone]

## 2022-01-06 NOTE — LETTER
January 10, 2022      Caesar Hernandez  [address] [facility] [address]        To Whom It May Concern:    Caesar Hernandez was seen in our clinic 1/6/21. Covid PCR testing was negative. He may return to work without restrictions.      Sincerely,        Paul Perea MD, [facility]

## 2022-01-06 NOTE — PROGRESS NOTES
"Assessment & Plan     Acute febrile illness  Cough  Covid and flu testing negative. Discussed possible false negative vs alternate viral process. Recommend supportive cares and symptomatic tx, rx for nighttime cough syrup. Close follow-up if not improving or new sx.   - SARS CoV2 CoVid 19 Qual  ([facility])  - Influenza A and B (BFP)  - guaiFENesin-codeine (ROBITUSSIN AC) 100-10 MG/5ML solution; Take 5-10 mLs by mouth nightly as needed for cough  Dispense: 118 mL; Refill: 0     >30 minutes spent on the date of the encounter doing chart review, history and exam, documentation and further activities per the note    Paul Perea MD, [facility]     Subjective     Caesar Hernandez is a 41 year old male who presents to clinic today for the following health issues:    HPI   Chief Complaint   Patient presents with     Covid Concern     12/27 started not feeling wel with GI issues such as diarrhea, dry cough started on tuesday, fever of 102.7, slight body aches and chills, had COVID 11/08     Acute Illness   Symptoms:  Fever: YES, 102.7 today with tylenol  Headache (location?): mild  Ear Pain: no  Rhinorrhea: no  Congestion: no  Sore Throat: no  Cough: YES-non-productive  Wheeze: no  Decreased Appetite: YES  Nausea: YES  Vomiting: no  Diarrhea: YES- improved  Dysuria/Freq.: no  Dysuria or Hematuria: no  Fatigue/Achiness: YES  Sick/Strep Exposure: YES- influenza exposure  Therapies tried and outcome: tylenol and dayquil, robitussin        Objective    /82 (BP Location: Left arm, Patient Position: Sitting, Cuff Size: Adult Regular)   Pulse 68   Temp 98.6  F (37  C) (Temporal)   Ht 1.651 m (5' 5\")   Wt 86.2 kg (190 lb)   SpO2 96%   BMI 31.62 kg/m    Body mass index is 31.62 kg/m .  Physical Exam   Alert, NAD  NC/AT  Sclerae anicteric  RRR  Resp nonlabored, harsh cough, no focal rhonchi or wheezes  Skin warm and dry  No focal neuro deficits. Speech intact.   Appropriate affect    Results for orders " placed or performed in visit on 01/06/22   SARS CoV2 CoVid 19 Qual  ([facility])     Status: None   Result Value Ref Range    SARS-CoV-2 (CoVid-19) NOT DETECTED NOT DETECTED    Narrative    FASTING: UNKNOWN   Influenza A and B (BFP)     Status: None   Result Value Ref Range    Influenza A neg neg    Influenza B neg neg

## 2022-01-07 ENCOUNTER — TELEPHONE (OUTPATIENT)
Dept: FAMILY MEDICINE | Facility: CLINIC | Age: 42
End: 2022-01-07

## 2022-01-07 NOTE — TELEPHONE ENCOUNTER
Caesar called this morning with some follow up questions since his visit yesterday for a cough. He had a neg flu swab and awaiting a PCR result for covid. He was prescribed Robitussin AC. The directions state to take 5 mL - 10 mL. He took 5 ml at 5 pm and another 5 ml at 9 pm. He did not sleep well. Woke up at 2 am sweating and took tylenol pm and finally fell asleep.    He has questions about how often can he take Robitussin per day? Does it interact with OTC meds? Which OTC do you recommend.    Also he wants to know at which point should he go to the hospital due to the underlying condition, thrombocytopenia?        Routing to Dr. Perea, please advise, thanks.        Caesar's phone # [phone]

## 2022-01-07 NOTE — CONFIDENTIAL NOTE
Robitussin with codeine is for bedtime as it can be sedating. He can take 10ml at one time, could take additional dose at least 4 hrs later if having trouble getting back to sleep.     During the daytime, I would use continue the OTC robitussin and/or dayquil he told me he has been using during the daytime, no other specific OTC recommendations.     List of reasons to go to ER:  Any sudden or severe pain, or uncontrolled bleeding  Sudden neurologic changes  Chest or upper abdominal pain or pressure  Confusion or changes in mental function, such as unexplained drowsiness or disorientation  Coughing or vomiting blood, or bright red blood in bowel movements  Difficulty breathing or shortness of breath  Difficulty speaking, slurred speech, or sudden severe headache  Fainting, sudden dizziness, or weakness on one side of your body or face  Poisoning or exposure to dangerous chemicals  Seizures lasting longer than five minutes or new onset seizures  Severe injuries like broken bones and head injuries, or injuries that came from an accident or fall, like intense back or neck pain, fractures and dislocations of bones, deep cuts, or severe burns  Severe or persistent vomiting or diarrhea  Severe or worsening reaction to an insect bite or sting, or to medication, especially if breathing becomes difficult  Signs of meningitis in adults: severe headaches, neck/joint pain and stiffness, vomiting, high temperature, and sensitivity to light  Suicidal or homicidal feelings

## 2022-01-08 LAB — SARS-COV-2 RNA SPEC QL NAA+PROBE: NOT DETECTED

## 2022-01-10 NOTE — TELEPHONE ENCOUNTER
Informed pt of the message below. Also sent a [employer] message.    [facility] PCR covid test did come back negative. He called wanting to follow up with this result. He said he still has a cough. I informed him that a cough can last weeks and to continue the suggested OTC options from Dr. Perea.    He is requesting a letter to be typed up by Dr. Perea and released on [employer] about returning to work due to a negative covid result. He would like to go back to work tomorrow. Thanks.

## 2022-01-19 ENCOUNTER — TELEPHONE (OUTPATIENT)
Dept: FAMILY MEDICINE | Facility: CLINIC | Age: 42
End: 2022-01-19

## 2022-01-19 NOTE — TELEPHONE ENCOUNTER
Pt called to say he is still having a cough that now gives him a headache at times. He has tried OTC meds an the Robitussin, he is wondering if there is anything else he can do.    Thanks

## 2022-01-20 ENCOUNTER — OFFICE VISIT (OUTPATIENT)
Dept: FAMILY MEDICINE | Facility: CLINIC | Age: 42
End: 2022-01-20

## 2022-01-20 VITALS
OXYGEN SATURATION: 97 % | HEIGHT: 65 IN | TEMPERATURE: 98.2 F | WEIGHT: 190 LBS | SYSTOLIC BLOOD PRESSURE: 112 MMHG | BODY MASS INDEX: 31.65 KG/M2 | DIASTOLIC BLOOD PRESSURE: 68 MMHG | HEART RATE: 92 BPM

## 2022-01-20 DIAGNOSIS — J98.01 BRONCHOSPASM: ICD-10-CM

## 2022-01-20 DIAGNOSIS — R05.9 COUGH: Primary | ICD-10-CM

## 2022-01-20 PROCEDURE — 71046 X-RAY EXAM CHEST 2 VIEWS: CPT | Performed by: STUDENT IN AN ORGANIZED HEALTH CARE EDUCATION/TRAINING PROGRAM

## 2022-01-20 PROCEDURE — 99214 OFFICE O/P EST MOD 30 MIN: CPT | Performed by: STUDENT IN AN ORGANIZED HEALTH CARE EDUCATION/TRAINING PROGRAM

## 2022-01-20 RX ORDER — ALBUTEROL SULFATE 90 UG/1
2 AEROSOL, METERED RESPIRATORY (INHALATION) EVERY 4 HOURS PRN
Qty: 18 G | Refills: 0 | Status: SHIPPED | OUTPATIENT
Start: 2022-01-20

## 2022-01-20 RX ORDER — PREDNISONE 20 MG/1
40 TABLET ORAL
Qty: 10 TABLET | Refills: 0 | Status: SHIPPED | OUTPATIENT
Start: 2022-01-20 | End: 2022-01-25

## 2022-01-20 ASSESSMENT — MIFFLIN-ST. JEOR: SCORE: 1693.71

## 2022-01-20 NOTE — PATIENT INSTRUCTIONS
Prednisone daily with breakfast for 5 days    Albuterol inhaler up to every 4 hrs if needed    Ok to combine with vicks, nyquil, etc

## 2022-01-20 NOTE — PROGRESS NOTES
"Assessment & Plan     1. Cough  2. Bronchospasm  Hx and exam most suggestive of ongoing bronchospasm after recent infection, no focal findings to suggest ongoing infection/pneumonia. CXR obtained today, report below. Pt agreeable to symptomatic tx and close follow-up if not improving or any new systemic sx.  - XR Chest 2 Views  - albuterol (PROAIR HFA/PROVENTIL HFA/VENTOLIN HFA) 108 (90 Base) MCG/ACT inhaler; Inhale 2 puffs into the lungs every 4 hours as needed for shortness of breath / dyspnea or wheezing  Dispense: 18 g; Refill: 0  - predniSONE (DELTASONE) 20 MG tablet; Take 2 tablets (40 mg) by mouth daily (with breakfast) for 5 days  Dispense: 10 tablet; Refill: 0     Paul Perea MD, [facility]       Subjective     Caesar Cortes is a 41 year old male who presents to clinic today for the following health issues:    HPI   Seen 22 for URI with cough. Negative Covid and flu testing.  Here today with persistent cough, rarely productive  No fevers, chills  Cough is interfering with sleep  Robitussin AC, nyquil, vicks... all only partially helpful        Objective    /68 (BP Location: Left arm, Patient Position: Sitting, Cuff Size: Adult Regular)   Pulse 92   Temp 98.2  F (36.8  C) (Oral)   Ht 1.651 m (5' 5\")   Wt 86.2 kg (190 lb)   SpO2 97%   BMI 31.62 kg/m    Body mass index is 31.62 kg/m .  Physical Exam   Alert, NAD  NC/AT  Sclerae anicteric  Regular  Resp nonlabored, harsh cough occasionally but no focal rhonchi or wheezes  Skin warm and dry  No focal neuro deficits. Speech intact.   Appropriate affect    IMAGING  XR Chest 2 Views    Result Date: 2022  [facility] Phone: [phone] * Fax: [phone] Nolberto Raillet [facility]. [address], [place] [address] Age: 41 Y Dept No.: [id number] CAESAR CORTES : 1980 Chart # Gender: M Req. Phys: Jose Vicente MD Clinic MRN: Clinic: [facility] Acc#: [id number] Exam: CHEST 2 VIEWS " PA AND LATERAL Exam Date: 01/20/2022 EXAM: CHEST 2 VIEWS PA AND LATERAL LOCATION: [facility] DATE/TIME: 1/20/2022 6:46 PM INDICATION: Cough. COMPARISON: 6/30/2021 IMPRESSION: Small nodular opacities in the right upper lobe are new, and could represent pneumonia. Correlate clinically and recommend a follow-up radiograph or CT to ensure resolution after treatment. No pleural effusion or pneumothorax. Normal heart size. Performed by: NORAH Transcribed By: ROSANNA on: 01/21/2022 7:23 AM CST Finalized By: HUGO MAURER M.D. on: 01/21/2022 7:23 AM CST Dictated By: HUGO MAURER M.D. Signed by: VI Page 1 of 1

## 2022-01-20 NOTE — TELEPHONE ENCOUNTER
Dry persistent cough is common with some of the viral infections currently in the community but if patient has concerns for any progression or worsening would recommend and cxr. Paul Perea MD on 1/20/2022 at 2:03 PM

## 2022-01-20 NOTE — TELEPHONE ENCOUNTER
Pt called again today and would like an inhaler to help his cough. PT states he is still only getting 4 hours of sleep at night. Pt would like you to discuss with [facility] as that is his PCP.

## 2022-01-20 NOTE — NURSING NOTE
Chief Complaint   Patient presents with     Cough     cough for 16 days. Not getting better and still waking him up at night, chest xray today.         Pre-visit Screening:  Immunizations:  up to date  Colonoscopy:  NA  Mammogram: NA  Asthma Action Test/Plan:  NA  PHQ9:  NA  GAD7:  NA  Questioned patient about current smoking habits Pt. has never smoked.  Ok to leave detailed message on voice mail for today's visit only Yes, phone # [phone]

## 2022-02-13 ENCOUNTER — HEALTH MAINTENANCE LETTER (OUTPATIENT)
Age: 42
End: 2022-02-13

## 2022-06-19 ENCOUNTER — OFFICE VISIT (OUTPATIENT)
Dept: URGENT CARE | Facility: URGENT CARE | Age: 42
End: 2022-06-19
Payer: COMMERCIAL

## 2022-06-19 VITALS
TEMPERATURE: 99 F | RESPIRATION RATE: 16 BRPM | OXYGEN SATURATION: 98 % | SYSTOLIC BLOOD PRESSURE: 112 MMHG | HEART RATE: 73 BPM | DIASTOLIC BLOOD PRESSURE: 66 MMHG | HEIGHT: 65 IN | BODY MASS INDEX: 31.65 KG/M2 | WEIGHT: 190 LBS

## 2022-06-19 DIAGNOSIS — L55.9 SUNBURN: Primary | ICD-10-CM

## 2022-06-19 PROCEDURE — 99213 OFFICE O/P EST LOW 20 MIN: CPT | Performed by: PHYSICIAN ASSISTANT

## 2022-06-19 RX ORDER — ACETAMINOPHEN 325 MG/1
975 TABLET ORAL ONCE
Status: COMPLETED | OUTPATIENT
Start: 2022-06-19 | End: 2022-06-19

## 2022-06-19 RX ORDER — SILVER SULFADIAZINE 10 MG/G
CREAM TOPICAL DAILY
Qty: 85 G | Refills: 3 | Status: SHIPPED | OUTPATIENT
Start: 2022-06-19 | End: 2023-03-22

## 2022-06-19 RX ADMIN — ACETAMINOPHEN 975 MG: 325 TABLET ORAL at 14:51

## 2022-06-19 NOTE — PROGRESS NOTES
"Assessment & Plan     Sunburn  Second degree.  silvadine with light gauze dressing. Allow blisters to remain in place until rupture on their own.    Tylenol for pain control.    Watch for signs of infection. PI given for wound care of second degree sunburns.    - acetaminophen (TYLENOL) tablet 975 mg  - silver sulfADIAZINE (SILVADENE) 1 % external cream  Dispense: 85 g; Refill: 3       Meghan Merchant PA-C  [facility] ALIN Maynard is a 41 year old male who presents to clinic today for the following health issues:  Chief Complaint   Patient presents with     Derm Problem     On lower both legs and back of knees x today -- BAD sunburn-- wife applied aloe     HPI    reffing soccer games yesterday and today, developed sigificant sun burn on the BLE posteriorly with blistering redness and significant pain.   Taking tylenol for pain.       Review of Systems  Constitutional, HEENT, cardiovascular, pulmonary, gi and gu systems are negative, except as otherwise noted.      Objective    /66 (BP Location: Left arm, Patient Position: Chair, Cuff Size: Adult Regular)   Pulse 73   Temp 99  F (37.2  C) (Oral)   Resp 16   Ht 1.651 m (5' 5\")   Wt 86.2 kg (190 lb)   SpO2 98%   BMI 31.62 kg/m    Physical Exam   nad appears uncomfortable sitting in WC.    Exam of the BLE posteriorly reveals erythema and blistering lesions from mid thigh to proximal calf B (between area exposed between tall socks and shorts B. )    Pt became lightheaded and weak in exam room while examining him.    He was placed in supine position, given fluids, granola bar, and cool compresses.  Likely some heat exhaustion from this morning.  Vitals rechecked and remained in the nromal range.  He rested at least 30 minues and then was placed in sitting position and tolerated that well.  Then when feeling better allowed to leave.              "

## 2022-06-24 ENCOUNTER — NURSE TRIAGE (OUTPATIENT)
Dept: NURSING | Facility: CLINIC | Age: 42
End: 2022-06-24

## 2022-06-24 NOTE — TELEPHONE ENCOUNTER
Pt was not seen here for this issue. If needs to re-addresses will need visit here. Pt went through a 85g tube of silver sulfadiazine in 5 days. Will need to wait until insurance will cover. Sent [employer] message.

## 2022-06-24 NOTE — TELEPHONE ENCOUNTER
Nurse Triage SBAR    Is this a 2nd Level Triage? YES    Situation/Background: Patient was seen Sunday, 6/19/22, for sunburn of bilateral lower extremities. He was given a prescription for silver sulfadiazine. Patient reports that he is out of the medication as he has used it all on his burns. Patient would like a refill of the prescription.  [facility] stated they are unable to fill the prescription until June 30th, due to insurance.     Assessment:   Pharmacy is [facility] in [place] on [address]          Recommendation: Per disposition, routed request to PCP Care Team to address. Advised patient to call back with any new or worsening symptoms. Patient verbalized understanding and agrees with plan.    PCP is Dr. Jose Vicente in [place]  Please call the patient at [phone]    Protocol Recommended Disposition: Paged/Called Provider    Pauline Hughes RN on 6/24/2022 at 11:59 AM    Reason for Disposition    Caller has NON-URGENT medication question about med that PCP prescribed and triager unable to answer question    Protocols used: MEDICATION QUESTION CALL-A-[place]

## 2022-08-24 ENCOUNTER — MEDICAL CORRESPONDENCE (OUTPATIENT)
Dept: LAB | Facility: CLINIC | Age: 42
End: 2022-08-24

## 2022-08-24 ENCOUNTER — OFFICE VISIT (OUTPATIENT)
Dept: FAMILY MEDICINE | Facility: CLINIC | Age: 42
End: 2022-08-24

## 2022-08-24 VITALS
DIASTOLIC BLOOD PRESSURE: 74 MMHG | HEIGHT: 65 IN | TEMPERATURE: 97.2 F | SYSTOLIC BLOOD PRESSURE: 100 MMHG | WEIGHT: 195.8 LBS | RESPIRATION RATE: 20 BRPM | BODY MASS INDEX: 32.62 KG/M2

## 2022-08-24 DIAGNOSIS — M25.562 PAIN IN BOTH KNEES, UNSPECIFIED CHRONICITY: ICD-10-CM

## 2022-08-24 DIAGNOSIS — B35.1 ONYCHOMYCOSIS: ICD-10-CM

## 2022-08-24 DIAGNOSIS — Q87.2 TAR SYNDROME (H): ICD-10-CM

## 2022-08-24 DIAGNOSIS — D69.42 TAR SYNDROME (H): ICD-10-CM

## 2022-08-24 DIAGNOSIS — E78.2 MIXED HYPERLIPIDEMIA: ICD-10-CM

## 2022-08-24 DIAGNOSIS — M25.561 PAIN IN BOTH KNEES, UNSPECIFIED CHRONICITY: ICD-10-CM

## 2022-08-24 DIAGNOSIS — Z71.89 ACP (ADVANCE CARE PLANNING): ICD-10-CM

## 2022-08-24 DIAGNOSIS — Z00.00 ROUTINE GENERAL MEDICAL EXAMINATION AT A HEALTH CARE FACILITY: Primary | ICD-10-CM

## 2022-08-24 PROCEDURE — 99213 OFFICE O/P EST LOW 20 MIN: CPT | Performed by: FAMILY MEDICINE

## 2022-08-24 RX ORDER — TERBINAFINE HYDROCHLORIDE 250 MG/1
250 TABLET ORAL DAILY
Qty: 90 TABLET | Refills: 0 | Status: SHIPPED | OUTPATIENT
Start: 2022-08-24 | End: 2022-11-22

## 2022-08-24 RX ORDER — SENNOSIDES 8.6 MG
650 CAPSULE ORAL EVERY 8 HOURS PRN
Status: ON HOLD | COMMUNITY
Start: 2022-08-24 | End: 2023-06-05

## 2022-08-24 NOTE — NURSING NOTE
Caesar Hernandez is here for a CPX.    Pre-visit planning  Immunizations -up to date  Colonoscopy -  Mammogram -  Asthma test --  PHQ9 -  CHARITY 7 -      Questioned patient about current smoking habits.  Pt. has never smoked.  Body mass index is 32.58 kg/m .  PULSE regular  My Chart: active  CLASSIFICATION OF OVERWEIGHT AND OBESITY BY BMI                        Obesity Class           BMI(kg/m2)  Underweight                                    < 18.5  Normal                                         18.5-24.9  Overweight                                     25.0-29.9  OBESITY                     I                  30.0-34.9                             II                 35.0-39.9  EXTREME OBESITY             III                >40                            Patient's  BMI Body mass index is 32.58 kg/m .

## 2022-08-24 NOTE — PROGRESS NOTES
3  SUBJECTIVE:   CC: Caesar Hernandez is an 41 year old male who presents for preventive health visit.       Patient has been advised of split billing requirements and indicates understanding: Yes  Healthy Habits:    Do you get at least three servings of calcium containing foods daily (dairy, green leafy vegetables, etc.)? yes    Amount of exercise or daily activities, outside of work: a few day(s) per week    Problems taking medications regularly No    Medication side effects: No    Have you had an eye exam in the past two years? yes    Do you see a dentist twice per year? yes    Do you have sleep apnea, excessive snoring or daytime drowsiness?no      Pt has TAVR syndrome- not fasting today- wants to get labs at hospital    Pt states toenails brittle and discolored-wants treatment    Pt notes pain in knees and ankles on and off for a few years    Pt feels ROM in wrists decreasing some, has known TAVR-wonders if newer surgery options    Today's PHQ-2 Score:   PHQ-2 ( 1999 Pf[employer]) 6/30/2021 9/2/2020   Q1: Little interest or pleasure in doing things 0 0   Q2: Feeling down, depressed or hopeless 0 0   PHQ-2 Score 0 0   PHQ-2 Total Score (12-17 Years)- Positive if 3 or more points; Administer PHQ-A if positive 0 0       Abuse: Current or Past(Physical, Sexual or Emotional)- No  Do you feel safe in your environment? Yes        Social History     Tobacco Use     Smoking status: Never Smoker     Smokeless tobacco: Never Used   Substance Use Topics     Alcohol use: Yes     Comment: rare     If you drink alcohol do you typically have >3 drinks per day or >7 drinks per week? No                      Last PSA: No results found for: PSA    Reviewed orders with patient. Reviewed health maintenance and updated orders accordingly - Yes  BP Readings from Last 3 Encounters:   08/24/22 100/74   06/19/22 112/66   01/20/22 112/68    Wt Readings from Last 3 Encounters:   08/24/22 88.8 kg (195 lb 12.8 oz)   06/19/22 86.2 kg (190 lb)   01/20/22  86.2 kg (190 lb)                  Patient Active Problem List   Diagnosis     TAR syndrome: thrombocytopenia and absent radius     Family history of ischemic heart disease     Thrombocytopenia (H)     Health Care Home     ACP (advance care planning)     Foot deformity, congenital     Vitiligo     GERD (gastroesophageal reflux disease)     Lipoma of skin and subcutaneous tissue     Past Surgical History:   Procedure Laterality Date     EXTRACTION(S) DENTAL Bilateral 6/24/2015    Procedure: EXTRACTION(S) DENTAL;  Surgeon: Selam Castillo DMD;  Location: [facility] OR     HERNIA REPAIR  2002     TESTICLE SURGERY  1987    undescended on right       Social History     Tobacco Use     Smoking status: Never Smoker     Smokeless tobacco: Never Used   Substance Use Topics     Alcohol use: Yes     Comment: rare     Family History   Problem Relation Age of Onset     Heart Disease Maternal Grandfather      Chemical Addiction Maternal Grandfather      Heart Disease Paternal Grandfather      Diabetes No family hx of          Current Outpatient Medications   Medication Sig Dispense Refill     acetaminophen (TYLENOL) 650 MG CR tablet Take 1 tablet (650 mg) by mouth every 8 hours as needed for mild pain or fever       albuterol (PROAIR HFA/PROVENTIL HFA/VENTOLIN HFA) 108 (90 Base) MCG/ACT inhaler Inhale 2 puffs into the lungs every 4 hours as needed for shortness of breath / dyspnea or wheezing 18 g 0     Cholecalciferol (VITAMIN D3) 125 MCG (5000 UT) CHEW Take 1 chew tab by mouth daily       Multiple Vitamin (MULTI-VITAMIN DAILY PO)        Omeprazole (PRILOSEC PO) Take 20 mg by mouth every morning       silver sulfADIAZINE (SILVADENE) 1 % external cream Apply topically daily With dressing changes 85 g 3     Allergies   Allergen Reactions     Aspirin      Nsaids Unknown     platelet abnormality     Recent Labs   Lab Test 05/26/20  0236 07/18/17  1034 05/18/15  1055   LDL  --  141* 111   HDL  --  56 56   TRIG  --  126 93   ALT  --  126*  "62   CR 0.93 1.08 0.98   GFRESTIMATED >90 77 87   GFRESTBLACK >90 >90   GFR Calc   >90   GFR Calc     POTASSIUM 3.9 4.4 3.7        Reviewed and updated as needed this visit by clinical staff   Tobacco   Meds  Problems   Surg Hx             Reviewed and updated as needed this visit by Provider                   Past Medical History:   Diagnosis Date     Coagulation disorder (H)      Complication of anesthesia      Difficult intubation     See anesthesia record from 5/10/2019     Exercise-induced asthma 3/20/2014     Gastro-oesophageal reflux disease      Heart murmur      Other chronic pain     foot pain bilat      Past Surgical History:   Procedure Laterality Date     EXTRACTION(S) DENTAL Bilateral 6/24/2015    Procedure: EXTRACTION(S) DENTAL;  Surgeon: Selam Castillo DMD;  Location: [facility] OR     HERNIA REPAIR  2002     TESTICLE SURGERY  1987    undescended on right       ROS:  CONSTITUTIONAL: NEGATIVE for fever, chills, change in weight  INTEGUMENTARY/SKIN: NEGATIVE for worrisome rashes, moles or lesions  EYES: NEGATIVE for vision changes or irritation  ENT: NEGATIVE for ear, mouth and throat problems  RESP: NEGATIVE for significant cough or SOB  CV: NEGATIVE for chest pain, palpitations or peripheral edema  GI: NEGATIVE for nausea, abdominal pain, heartburn, or change in bowel habits   male: negative for dysuria, hematuria, decreased urinary stream, erectile dysfunction, urethral discharge  MUSCULOSKELETAL: NEGATIVE for significant arthralgias or myalgia  NEURO: NEGATIVE for weakness, dizziness or paresthesias  ENDOCRINE: NEGATIVE for temperature intolerance, skin/hair changes  PSYCHIATRIC: NEGATIVE for changes in mood or affect    OBJECTIVE:   /74 (BP Location: Right arm, Patient Position: Chair, Cuff Size: Adult Regular)   Temp 97.2  F (36.2  C) (Temporal)   Resp 20   Ht 1.651 m (5' 5\")   Wt 88.8 kg (195 lb 12.8 oz)   BMI 32.58 kg/m    EXAM:  GENERAL: healthy, " alert and no distress  EYES: Eyes grossly normal to inspection, PERRL and conjunctivae and sclerae normal  HENT: ear canals and TM's normal, nose and mouth without ulcers or lesions  NECK: no adenopathy, no asymmetry, masses, or scars and thyroid normal to palpation  RESP: lungs clear to auscultation - no rales, rhonchi or wheezes  CV: regular rate and rhythm, normal S1 S2, no S3 or S4, no murmur, click or rub, no peripheral edema and peripheral pulses strong  ABDOMEN: soft, nontender, no hepatosplenomegaly, no masses and bowel sounds normal  MS: no gross musculoskeletal defects noted, no edema  SKIN: toenails thickened and slightly discolored  NEURO: Normal strength and tone, mentation intact and speech normal  BACK: no CVA tenderness, no paralumbar tenderness  PSYCH: mentation appears normal, affect normal/bright    Diagnostic Test Results:  Labs reviewed in Epic    ASSESSMENT/PLAN:   (Z00.00) Routine general medical examination at a health care facility  (primary encounter diagnosis)  Comment: discussed preventitive healthcare   Plan: Continue to work on healthy diet and exercise, discussed healthy habits     (Q87.2) TAR syndrome: thrombocytopenia and absent radius  Comment: will check labs- hospital draw  Plan:     (E78.2) Mixed hyperlipidemia  Comment: control uncertain  Plan: Continue to work on healthy diet and exercise, discussed healthy habits     (Z71.89) ACP (advance care planning)  Comment:   Plan:     (B35.1) Onychomycosis  Comment: discussed likely toenail fungal issue-has not responded to OTC therapies, pt bothered by cracking and discoloration  Plan: will prescribe lamisil but he will not start inless liver tests normal avoid ETOH and Tylenol    (M25.561,  M25.562) Pain in both knees, unspecified chronicity  Comment: likely strain at times  Plan: Continue to work on healthy diet and exercise, discussed healthy habits     Ortho if not better    Patient has been advised of split billing requirements  "and indicates understanding: Yes  COUNSELING:  Reviewed preventive health counseling, as reflected in patient instructions       Regular exercise       Healthy diet/nutrition       Vision screening    Estimated body mass index is 32.58 kg/m  as calculated from the following:    Height as of this encounter: 1.651 m (5' 5\").    Weight as of this encounter: 88.8 kg (195 lb 12.8 oz).    Weight management plan: Discussed healthy diet and exercise guidelines    He reports that he has never smoked. He has never used smokeless tobacco.      Counseling Resources:  ATP IV Guidelines  Pooled Cohorts Equation Calculator  FRAX Risk Assessment  ICSI Preventive Guidelines  Dietary Guidelines for Americans, 2010  USDA's MyPlate  ASA Prophylaxis  Lung CA Screening    Jose Vicente MD  [facility]  "

## 2022-08-26 ENCOUNTER — LAB (OUTPATIENT)
Dept: LAB | Facility: CLINIC | Age: 42
End: 2022-08-26
Payer: COMMERCIAL

## 2022-08-26 DIAGNOSIS — Z95.2 HISTORY OF TRANSCATHETER AORTIC VALVE REPLACEMENT (TAVR): ICD-10-CM

## 2022-08-26 DIAGNOSIS — Z13.220 LIPID SCREENING: Primary | ICD-10-CM

## 2022-08-26 DIAGNOSIS — Z11.59 SCREENING FOR VIRAL DISEASE: ICD-10-CM

## 2022-08-26 DIAGNOSIS — Z13.1 SCREENING FOR DIABETES MELLITUS: ICD-10-CM

## 2022-08-26 LAB
ALBUMIN SERPL BCG-MCNC: 4.3 G/DL (ref 3.5–5.2)
ALP SERPL-CCNC: 91 U/L (ref 40–129)
ALT SERPL W P-5'-P-CCNC: 127 U/L (ref 10–50)
ANION GAP SERPL CALCULATED.3IONS-SCNC: 9 MMOL/L (ref 7–15)
AST SERPL W P-5'-P-CCNC: 55 U/L (ref 10–50)
BASOPHILS # BLD AUTO: 0.1 10E3/UL (ref 0–0.2)
BASOPHILS NFR BLD AUTO: 1 %
BILIRUB SERPL-MCNC: 1.3 MG/DL
BUN SERPL-MCNC: 17.3 MG/DL (ref 6–20)
CALCIUM SERPL-MCNC: 9.3 MG/DL (ref 8.6–10)
CHLORIDE SERPL-SCNC: 104 MMOL/L (ref 98–107)
CHOLEST SERPL-MCNC: 223 MG/DL
CREAT SERPL-MCNC: 0.96 MG/DL (ref 0.67–1.17)
DEPRECATED HCO3 PLAS-SCNC: 26 MMOL/L (ref 22–29)
EOSINOPHIL # BLD AUTO: 0.2 10E3/UL (ref 0–0.7)
EOSINOPHIL NFR BLD AUTO: 3 %
ERYTHROCYTE [DISTWIDTH] IN BLOOD BY AUTOMATED COUNT: 15.1 % (ref 10–15)
GFR SERPL CREATININE-BSD FRML MDRD: >90 ML/MIN/1.73M2
GLUCOSE SERPL-MCNC: 97 MG/DL (ref 70–99)
HCT VFR BLD AUTO: 47.2 % (ref 40–53)
HDLC SERPL-MCNC: 47 MG/DL
HGB BLD-MCNC: 15 G/DL (ref 13.3–17.7)
IMM GRANULOCYTES # BLD: 0 10E3/UL
IMM GRANULOCYTES NFR BLD: 0 %
LDLC SERPL CALC-MCNC: 143 MG/DL
LYMPHOCYTES # BLD AUTO: 2.1 10E3/UL (ref 0.8–5.3)
LYMPHOCYTES NFR BLD AUTO: 27 %
MCH RBC QN AUTO: 29 PG (ref 26.5–33)
MCHC RBC AUTO-ENTMCNC: 31.8 G/DL (ref 31.5–36.5)
MCV RBC AUTO: 91 FL (ref 78–100)
MONOCYTES # BLD AUTO: 0.5 10E3/UL (ref 0–1.3)
MONOCYTES NFR BLD AUTO: 6 %
NEUTROPHILS # BLD AUTO: 4.9 10E3/UL (ref 1.6–8.3)
NEUTROPHILS NFR BLD AUTO: 63 %
NONHDLC SERPL-MCNC: 176 MG/DL
NRBC # BLD AUTO: 0 10E3/UL
NRBC BLD AUTO-RTO: 0 /100
PLATELET # BLD AUTO: 77 10E3/UL (ref 150–450)
POTASSIUM SERPL-SCNC: 4.3 MMOL/L (ref 3.4–5.3)
PROT SERPL-MCNC: 6.4 G/DL (ref 6.4–8.3)
RBC # BLD AUTO: 5.17 10E6/UL (ref 4.4–5.9)
SODIUM SERPL-SCNC: 139 MMOL/L (ref 136–145)
TRIGL SERPL-MCNC: 164 MG/DL
WBC # BLD AUTO: 7.8 10E3/UL (ref 4–11)

## 2022-08-26 PROCEDURE — 36415 COLL VENOUS BLD VENIPUNCTURE: CPT

## 2022-08-26 PROCEDURE — 86803 HEPATITIS C AB TEST: CPT

## 2022-08-26 PROCEDURE — 80061 LIPID PANEL: CPT

## 2022-08-26 PROCEDURE — 80053 COMPREHEN METABOLIC PANEL: CPT

## 2022-08-26 PROCEDURE — 87389 HIV-1 AG W/HIV-1&-2 AB AG IA: CPT

## 2022-08-26 PROCEDURE — 85004 AUTOMATED DIFF WBC COUNT: CPT

## 2022-08-27 LAB
HCV AB SERPL QL IA: NONREACTIVE
HIV 1+2 AB+HIV1 P24 AG SERPL QL IA: NONREACTIVE

## 2022-08-30 ENCOUNTER — TELEPHONE (OUTPATIENT)
Dept: FAMILY MEDICINE | Facility: CLINIC | Age: 42
End: 2022-08-30

## 2022-08-30 DIAGNOSIS — B35.1 ONYCHOMYCOSIS: ICD-10-CM

## 2022-08-30 DIAGNOSIS — R79.89 ELEVATED LFTS: Primary | ICD-10-CM

## 2022-08-30 NOTE — TELEPHONE ENCOUNTER
Pt called with questions about Dr.Clarks Lynne message. He was wondering where we refer for a liver ultrasound and how that works. Left him detailed message that we typically send an order to [facility]/ [facility] and they call him to get scheduled. Advised him to call back if he would like to proceed with this plan.

## 2022-08-31 NOTE — TELEPHONE ENCOUNTER
Pt left a voicemail stating that he'd like to move forward with a liver US to [facility].  Routing to Dr. Vicente, thanks.

## 2022-09-06 NOTE — TELEPHONE ENCOUNTER
Caesar left a message regarding the liver US results and some concerns with that.    1) It states enlarged liver, fatty liver. He wants to know what to do for this?  2) His medication has a risk factor of liver issues? Is that of concern?    Please advise, thanks.      Pt phone # [phone]

## 2022-09-06 NOTE — TELEPHONE ENCOUNTER
D/w pt-fatty liver, LFTs stable over 5 years, pt starting lamisil for onychomycosis-will check LFT 1 month into treatment to make sure not significant worsening

## 2022-09-27 ENCOUNTER — OFFICE VISIT (OUTPATIENT)
Dept: FAMILY MEDICINE | Facility: CLINIC | Age: 42
End: 2022-09-27

## 2022-09-27 VITALS
HEART RATE: 68 BPM | WEIGHT: 195 LBS | TEMPERATURE: 97.3 F | SYSTOLIC BLOOD PRESSURE: 104 MMHG | HEIGHT: 65 IN | BODY MASS INDEX: 32.49 KG/M2 | OXYGEN SATURATION: 98 % | DIASTOLIC BLOOD PRESSURE: 68 MMHG

## 2022-09-27 DIAGNOSIS — R05.3 CHRONIC COUGH: Primary | ICD-10-CM

## 2022-09-27 DIAGNOSIS — J06.9 VIRAL URI WITH COUGH: ICD-10-CM

## 2022-09-27 PROCEDURE — 71046 X-RAY EXAM CHEST 2 VIEWS: CPT | Performed by: STUDENT IN AN ORGANIZED HEALTH CARE EDUCATION/TRAINING PROGRAM

## 2022-09-27 PROCEDURE — 99213 OFFICE O/P EST LOW 20 MIN: CPT | Performed by: STUDENT IN AN ORGANIZED HEALTH CARE EDUCATION/TRAINING PROGRAM

## 2022-09-27 RX ORDER — PREDNISONE 20 MG/1
40 TABLET ORAL
Qty: 10 TABLET | Refills: 0 | Status: SHIPPED | OUTPATIENT
Start: 2022-09-27 | End: 2022-10-02

## 2022-09-27 NOTE — PROGRESS NOTES
"Assessment & Plan       ICD-10-CM    1. Chronic cough  R05.3 XR Chest 2 Views     predniSONE (DELTASONE) 20 MG tablet      2. Viral URI with cough  J06.9 predniSONE (DELTASONE) 20 MG tablet         XRs obtained and reviewed with patient, imaging and exam reassuring. Postinfectious bronchospasm, discussed monitoring vs r/b of treating incl prednisone. Pt elects to try, follow-up precuations reviewed. .     Paul Perea MD, [facility]       Subjective     Caesar Hernandez is a 41 year old male who presents to clinic today for the following health issues:    HPI   Chief Complaint   Patient presents with     Cough     Ongoing cough, will do a chest xray today, total of 3 weeks  An inhaler in Jan helped  Wondering if it is long covid symptoms      Sx onset since day after labor day. Initially productive cough but now dry. Feels a lot better except for cough. No CP or ALVARENGA. No fevers. EAting and drinking normally.         Objective    /68 (BP Location: Left arm, Patient Position: Sitting, Cuff Size: Adult Regular)   Pulse 68   Temp 97.3  F (36.3  C) (Temporal)   Ht 1.651 m (5' 5\")   Wt 88.5 kg (195 lb)   SpO2 98%   BMI 32.45 kg/m    Body mass index is 32.45 kg/m .  Alert, NAD  NC/AT  Sclerae anicteric  RRR  Resp nonlabored, occas harsh cough, lungs CTAB  Skin warm and dry  No focal neuro deficits. Speech intact.   Appropriate affect    Results for orders placed or performed in visit on 09/27/22   XR Chest 2 Views     Status: None    Narrative    Radiologist Consultation/:   " Fax:  [phone]  [phone]  _____________________________________________________________________________________________________________________________________________________________________________________________________________________________________________________________________________________________________________________________________________________________________________________________________________________________________________________________________________________________________  PATIENT NAME: PUTT, MADAN E  YOB: 1980 Age: 41 ACCESSION NUMBER: [id number]  SEX: M ORDERING PROVIDER: Paul Perea  FACILITY[facility] PRIMARY PROVIDER:  PATIENT ID: [id number] INTERESTED PARTY:  Page 1 of 1  _________________________________________________________________________________________________________________________________________________________________________________________________________________________________________________________________________________________________________________________________________________________________________________________  EXAM: XRAY CHEST 2 VIEW AP LATERAL  LOCATION: [facility]  DATE/TIME: 9/27/2022 3:12 PM  INDICATION: Chronic cough  COMPARISON: 05/26/2020  IMPRESSION: Lungs are clear. Heart and pulmonary vascularity are normal. No signs of acute disease.  SIGNED BY: Mary Carmen Robles MD 9/28/2022 10:01 AM

## 2022-10-04 ENCOUNTER — TELEPHONE (OUTPATIENT)
Dept: FAMILY MEDICINE | Facility: CLINIC | Age: 42
End: 2022-10-04

## 2022-10-04 NOTE — TELEPHONE ENCOUNTER
OK to start lamisil, needs liver function test in a month    OK for flu and bivalent covid booster

## 2022-10-04 NOTE — TELEPHONE ENCOUNTER
Pt called stating he just finished the prednisone prescribed by [facility] for his cough. He wanted to be sure it was ok to start the lamisil now? He also wanted to be sure you were ok with him now getting his flu shot and booster?    Please advise # [phone]    Thanks,Rosangela LOCKHART

## 2022-10-15 ENCOUNTER — HEALTH MAINTENANCE LETTER (OUTPATIENT)
Age: 42
End: 2022-10-15

## 2022-10-19 ENCOUNTER — TELEPHONE (OUTPATIENT)
Dept: FAMILY MEDICINE | Facility: CLINIC | Age: 42
End: 2022-10-19

## 2022-10-19 DIAGNOSIS — R05.3 CHRONIC COUGH: Primary | ICD-10-CM

## 2022-10-19 NOTE — TELEPHONE ENCOUNTER
Pt called stating for 1 day after prednisone he was feeling better. He then shared a sand which with his sick daughter and his cough returned. 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 He said you had mentioned possibly seeing [facility].     He is wondering what his next steps should be. If there is anything else he can take and/or who he should see.    Please advise # [phone]    Thanks, Rosangela LOCKHART

## 2022-10-20 NOTE — TELEPHONE ENCOUNTER
Pulmonary referral in to [place] [facility], patient to call to schedule. Can follow-up in clinic here if he would like to discuss further or any new sx develop. MARTHA AGUIRRE MD on 10/20/2022 at 3:04 PM

## 2022-10-25 ENCOUNTER — MYC MEDICAL ADVICE (OUTPATIENT)
Dept: FAMILY MEDICINE | Facility: CLINIC | Age: 42
End: 2022-10-25

## 2022-10-25 DIAGNOSIS — R79.89 ELEVATED LFTS: Primary | ICD-10-CM

## 2022-12-15 ENCOUNTER — LAB (OUTPATIENT)
Dept: LAB | Facility: CLINIC | Age: 42
End: 2022-12-15
Payer: COMMERCIAL

## 2022-12-15 DIAGNOSIS — R79.89 ELEVATED LFTS: ICD-10-CM

## 2022-12-15 LAB
ALBUMIN SERPL BCG-MCNC: 4.6 G/DL (ref 3.5–5.2)
ALP SERPL-CCNC: 92 U/L (ref 40–129)
ALT SERPL W P-5'-P-CCNC: 83 U/L (ref 10–50)
AST SERPL W P-5'-P-CCNC: 33 U/L (ref 10–50)
BILIRUB DIRECT SERPL-MCNC: 0.23 MG/DL (ref 0–0.3)
BILIRUB SERPL-MCNC: 1 MG/DL
PROT SERPL-MCNC: 6.8 G/DL (ref 6.4–8.3)

## 2022-12-15 PROCEDURE — 80076 HEPATIC FUNCTION PANEL: CPT

## 2022-12-15 PROCEDURE — 36415 COLL VENOUS BLD VENIPUNCTURE: CPT

## 2023-03-22 ENCOUNTER — OFFICE VISIT (OUTPATIENT)
Dept: FAMILY MEDICINE | Facility: CLINIC | Age: 43
End: 2023-03-22

## 2023-03-22 VITALS
HEIGHT: 65 IN | DIASTOLIC BLOOD PRESSURE: 64 MMHG | TEMPERATURE: 98.2 F | BODY MASS INDEX: 34.99 KG/M2 | SYSTOLIC BLOOD PRESSURE: 104 MMHG | WEIGHT: 210 LBS

## 2023-03-22 DIAGNOSIS — D17.1 LIPOMA OF BACK: Primary | ICD-10-CM

## 2023-03-22 DIAGNOSIS — D22.9 CHANGE IN MOLE: ICD-10-CM

## 2023-03-22 PROCEDURE — 99213 OFFICE O/P EST LOW 20 MIN: CPT | Performed by: FAMILY MEDICINE

## 2023-03-22 RX ORDER — FLUTICASONE PROPIONATE AND SALMETEROL 250; 50 UG/1; UG/1
1 POWDER RESPIRATORY (INHALATION) 2 TIMES DAILY
COMMUNITY
Start: 2023-03-22

## 2023-03-22 NOTE — NURSING NOTE
Chief Complaint   Patient presents with     Cyst     Has cyst on his back, in 2019 went into surgery and had difficulty with intubation, cyst is still there and bothering him      Pre-visit Screening:  Immunizations:  up to date  Colonoscopy:  is up to date  Mammogram: NA  Asthma Action Test/Plan:  NA  PHQ9:  NA  GAD7:  NA  Questioned patient about current smoking habits Pt. has never smoked.  Ok to leave detailed message on voice mail for today's visit only Yes, phone # [phone]

## 2023-03-22 NOTE — PROGRESS NOTES
SUBJECTIVE: Caesar Hernandez is a 42 year old male who presents for lipoma in back -present years- was in surgery for excision 2019 but was not able to have this removed due to difficult intubation. Pt would like this removed as it still bothers him. He feels it when he sits in chairs, can be painful when in heat.     Patient Active Problem List   Diagnosis     TAR syndrome: thrombocytopenia and absent radius     Family history of ischemic heart disease     Thrombocytopenia (H)     Health Care Home     ACP (advance care planning)     Foot deformity, congenital     Vitiligo     GERD (gastroesophageal reflux disease)     Lipoma of skin and subcutaneous tissue     Past Medical History:   Diagnosis Date     Coagulation disorder (H)      Complication of anesthesia      Difficult intubation     See anesthesia record from 5/10/2019     Exercise-induced asthma 3/20/2014     Gastro-oesophageal reflux disease      Heart murmur      Other chronic pain     foot pain bilat     Family History   Problem Relation Age of Onset     Heart Disease Maternal Grandfather      Chemical Addiction Maternal Grandfather      Heart Disease Paternal Grandfather      Diabetes No family hx of      Social History     Socioeconomic History     Marital status:      Spouse name: reny     Number of children: 2     Years of education: Not on file     Highest education level: Not on file   Occupational History     Occupation: teacher     Comment: [employer]   Tobacco Use     Smoking status: Never     Smokeless tobacco: Never   Vaping Use     Vaping Use: Never used   Substance and Sexual Activity     Alcohol use: Yes     Comment: rare     Drug use: No     Sexual activity: Yes     Partners: Female   Other Topics Concern     Parent/sibling w/ CABG, MI or angioplasty before 65F 55M? Not Asked   Social History Narrative     Not on file     Social Determinants of Health     Financial Resource Strain: Not on file   Food Insecurity: Not on file  "  Transportation Needs: Not on file   Physical Activity: Not on file   Stress: Not on file   Social Connections: Not on file   Intimate Partner Violence: Not on file   Housing Stability: Not on file     Past Surgical History:   Procedure Laterality Date     EXTRACTION(S) DENTAL Bilateral 6/24/2015    Procedure: EXTRACTION(S) DENTAL;  Surgeon: Selam Castillo DMD;  Location: [facility] OR     HERNIA REPAIR  2002     TESTICLE SURGERY  1987    undescended on right     acetaminophen (TYLENOL) 650 MG CR tablet, Take 1 tablet (650 mg) by mouth every 8 hours as needed for mild pain or fever  albuterol (PROAIR HFA/PROVENTIL HFA/VENTOLIN HFA) 108 (90 Base) MCG/ACT inhaler, Inhale 2 puffs into the lungs every 4 hours as needed for shortness of breath / dyspnea or wheezing  Cholecalciferol (VITAMIN D3) 125 MCG (5000 UT) CHEW, Take 1 chew tab by mouth daily  fluticasone-salmeterol (ADVAIR) 250-50 MCG/ACT inhaler, Inhale 1 puff into the lungs 2 times daily  Multiple Vitamin (MULTI-VITAMIN DAILY PO),   Omeprazole (PRILOSEC PO), Take 20 mg by mouth every morning    No current facility-administered medications on file prior to visit.       Allergies: Aspirin, Dust mites, and Nsaids    Immunization History   Administered Date(s) Administered     COVID-19 Vaccine 12+ (Pfizer 2022) 03/08/2022     COVID-19 Vaccine 18+ (Moderna) 02/25/2021, 03/25/2021     Influenza Vaccine >6 months (Alfuria,Fluzone) 01/23/2018, 10/12/2022     Influenza,INJ,MDCK,PF,Quad >6mo(Flucelvax) 09/19/2020     TD,PF 7+ (Tenivac) 08/09/2004     TDAP Vaccine (Adacel) 03/21/2014        OBJECTIVE: /64 (BP Location: Right arm, Patient Position: Sitting, Cuff Size: Adult Regular)   Temp 98.2  F (36.8  C) (Temporal)   Ht 1.651 m (5' 5\")   Wt 95.3 kg (210 lb)   BMI 34.95 kg/m     Skin- large fleshy lipoma right scapular region, nontender    ASSESSMENT: /PLAN:   (D17.1) Lipoma of back  (primary encounter diagnosis)  Comment: pt still has symptoms from lipoma-desires " removal  Plan: refer back to surgery for planning- options for anesthesia        (D22.9) Change in mole  Comment: pt desires skin check  Plan: Adult Dermatology Referral

## 2023-03-23 ENCOUNTER — MYC MEDICAL ADVICE (OUTPATIENT)
Dept: FAMILY MEDICINE | Facility: CLINIC | Age: 43
End: 2023-03-23

## 2023-03-26 ENCOUNTER — HEALTH MAINTENANCE LETTER (OUTPATIENT)
Age: 43
End: 2023-03-26

## 2023-04-06 ENCOUNTER — OFFICE VISIT (OUTPATIENT)
Dept: SURGERY | Facility: CLINIC | Age: 43
End: 2023-04-06
Payer: COMMERCIAL

## 2023-04-06 ENCOUNTER — TELEPHONE (OUTPATIENT)
Dept: SURGERY | Facility: CLINIC | Age: 43
End: 2023-04-06

## 2023-04-06 VITALS
OXYGEN SATURATION: 96 % | HEIGHT: 65 IN | HEART RATE: 94 BPM | RESPIRATION RATE: 18 BRPM | SYSTOLIC BLOOD PRESSURE: 108 MMHG | WEIGHT: 205 LBS | BODY MASS INDEX: 34.16 KG/M2 | DIASTOLIC BLOOD PRESSURE: 79 MMHG

## 2023-04-06 DIAGNOSIS — D17.1 LIPOMA OF TORSO: Primary | ICD-10-CM

## 2023-04-06 PROCEDURE — 99242 OFF/OP CONSLTJ NEW/EST SF 20: CPT | Performed by: SURGERY

## 2023-04-06 NOTE — TELEPHONE ENCOUNTER
Type of surgery: excision of back lipoma  Location of surgery: [facility]  Date and time of surgery: 6/5/23 7:30am  Surgeon: Dr Jones  Pre-Op Appt Date: pt to schedule  Post-Op Appt Date: pt to schedule   Packet sent out: Yes  Pre-cert/Authorization completed:  Not Applicable  Date: 4/6/23

## 2023-04-15 NOTE — PROGRESS NOTES
"[facility]  Surgery Consultation    PCP:  Jose Vicente [phone]    HPI: Is a 42-year-old gentleman who presents for consultation regarding back lipoma.  This has been present for some time.  Is gotten bigger over time.  It causes occasional discomfort with palpation or pressure.    PMH:   has a past medical history of Coagulation disorder (H), Complication of anesthesia, Difficult intubation, Exercise-induced asthma (3/20/2014), Gastro-oesophageal reflux disease, Heart murmur, and Other chronic pain.  PSH:    has a past surgical history that includes hernia repair (2002); Testicle surgery (1987); and Extraction(s) dental (Bilateral, 6/24/2015).  Social History:   reports that he has never smoked. He has never used smokeless tobacco. He reports current alcohol use. He reports that he does not use drugs.  Family History:   family history includes Chemical Addiction in his maternal grandfather; Heart Disease in his maternal grandfather and paternal grandfather.  Medications/Allergies: Home medications and allergies reviewed.    ROS:  The 10 point Review of Systems is negative other than noted in the HPI.    Physical Exam:  /79   Pulse 94   Resp 18   Ht 1.651 m (5' 5\")   Wt 93 kg (205 lb)   SpO2 96%   BMI 34.11 kg/m    GENERAL: Generally appears well.  Psych: Alert and Oriented.  Normal affect  Eyes: Sclera clear  Respiratory:  Lungs clear to ausculation bilaterally with good air excursion  Cardiovascular:  Regular Rate and Rhythm with no murmurs gallops or rubs, normal peripheral pulses  Integumentary:  No rashes, on the back he has a well-circumscribed abnormality consistent with lipoma.  This is freely mobile.  Neurological: grossly intact      All new lab and imaging data was reviewed.     Impression and Plan:  Patient is a 42 year old male with back lipoma    PLAN: Given size and location I think this is going to require a general anesthesia for excision.  Risks, " benefits alternatives were reviewed.  Questions answered.  He is interested in proceeding.  This will be scheduled at his convenience.      Thank you very much for this consult.    Calos Jones M.D.  [facility]  [phone]    Please route or send letter to:  Primary Care Provider (PCP) and Referring Provider

## 2023-04-21 ENCOUNTER — OFFICE VISIT (OUTPATIENT)
Dept: FAMILY MEDICINE | Facility: CLINIC | Age: 43
End: 2023-04-21

## 2023-04-21 VITALS
RESPIRATION RATE: 24 BRPM | BODY MASS INDEX: 35.11 KG/M2 | OXYGEN SATURATION: 97 % | HEART RATE: 105 BPM | TEMPERATURE: 98 F | WEIGHT: 211 LBS | SYSTOLIC BLOOD PRESSURE: 122 MMHG | DIASTOLIC BLOOD PRESSURE: 82 MMHG

## 2023-04-21 DIAGNOSIS — R05.3 CHRONIC COUGH: Primary | ICD-10-CM

## 2023-04-21 DIAGNOSIS — J45.901 EXACERBATION OF ASTHMA, UNSPECIFIED ASTHMA SEVERITY, UNSPECIFIED WHETHER PERSISTENT: ICD-10-CM

## 2023-04-21 LAB — COVID-19: NEGATIVE

## 2023-04-21 PROCEDURE — 71046 X-RAY EXAM CHEST 2 VIEWS: CPT | Performed by: STUDENT IN AN ORGANIZED HEALTH CARE EDUCATION/TRAINING PROGRAM

## 2023-04-21 PROCEDURE — G2023 SPECIMEN COLLECT COVID-19: HCPCS | Performed by: STUDENT IN AN ORGANIZED HEALTH CARE EDUCATION/TRAINING PROGRAM

## 2023-04-21 PROCEDURE — 99213 OFFICE O/P EST LOW 20 MIN: CPT | Performed by: STUDENT IN AN ORGANIZED HEALTH CARE EDUCATION/TRAINING PROGRAM

## 2023-04-21 PROCEDURE — 87635 SARS-COV-2 COVID-19 AMP PRB: CPT | Performed by: STUDENT IN AN ORGANIZED HEALTH CARE EDUCATION/TRAINING PROGRAM

## 2023-04-21 RX ORDER — PREDNISONE 10 MG/1
TABLET ORAL
COMMUNITY
Start: 2023-04-20

## 2023-04-21 NOTE — NURSING NOTE
Chief Complaint   Patient presents with     URI     Has had a cough for the past 2 weeks now, noticed it started once the snow melted and it became hot outside, was having asthma attacks and [facility] told him to take prednisone which he has, has not helped as much and the cough is still around, has had some congestion as well but no fevers, has thick mucous in his throat, did do at home covid tests which were negative, feels this started with allergies        Pre-visit Screening:  Immunizations:  up to date  Colonoscopy:  NA  Mammogram: NA  Asthma Action Test/Plan:  NA  PHQ9:  NA  GAD7:  NA  Questioned patient about current smoking habits Pt. has never smoked.  Ok to leave detailed message on voice mail for today's visit only yes, phone # [phone]

## 2023-04-21 NOTE — PROGRESS NOTES
Assessment & Plan       ICD-10-CM    1. Cough  R05.9 XR Chest 2 Views     COVID-19 ([facility])      2. Exacerbation of asthma, unspecified asthma severity, unspecified whether persistent  J45.901          cxr reviewed w pt, no change since prior. Exam reassuring, suspect reactive. Agreed on plan below. Pulm follow-up as planned.    Patient Instructions   Start antihistamine daily    Continue inhalers     Continue prednisone taper            Reasons to follow-up sooner or seek emergent care reviewed.       Paul Perea MD, [facility]       Subjective     Caesar Hernandez is a 42 year old male who presents to clinic today for the following health issues:    HPI   Chief Complaint   Patient presents with     URI     Has had a cough for the past 2 weeks now, noticed it started once the snow melted and it became hot outside, was having asthma attacks and [facility] told him to take prednisone which he has, has not helped as much and the cough is still around, has had some congestion as well but no fevers, has thick mucous in his throat, did do at home covid tests which were negative, feels this started with allergies       Cough present for a few months  advair usually once a day, better about BID since sx started  Prednisone started last night          Objective    /82 (BP Location: Right arm, Patient Position: Sitting, Cuff Size: Adult Large)   Pulse 105   Temp 98  F (36.7  C) (Temporal)   Resp 24   Wt 95.7 kg (211 lb)   SpO2 97%   BMI 35.11 kg/m    Body mass index is 35.11 kg/m .  Alert, NAD  NC/AT  Sclerae anicteric  RRR  Resp nonlabored, CTAB w occas cough  Skin warm and dry  No focal neuro deficits. Speech intact.   Appropriate affect  Normal gait.    Results for orders placed or performed in visit on 04/21/23   XR Chest 2 Views     Status: None    Narrative    Radiologist Consultation/:    Fax:  [phone]  [phone]  _____________________________________________________________________________________________________________________________________________________________________________________________________________________________________________________________________________________________________________________________________________________________________________________________________________________________________________________________________________________________________  PATIENT NAME: PUTT, MADAN E  YOB: 1980 Age: 42 ACCESSION NUMBER: [id number]  SEX: M ORDERING PROVIDER: Paul Perea  FACILITY: [facility] PRIMARY PROVIDER:  PATIENT ID: [id number] INTERESTED PARTY:  Page 1 of 1  _________________________________________________________________________________________________________________________________________________________________________________________________________________________________________________________________________________________________________________________________________________________________________________________  EXAM: XRAY CHEST 2 VIEW AP LATERAL  LOCATION: [facility]  DATE/TIME: 4/21/2023 9:55 AM CDT  INDICATION: Cough  COMPARISON: 09/27/2022  IMPRESSION: Lungs are grossly clear. No significant change from previous.  SIGNED BY: Bladimir Elam MD 4/21/2023 12:43 PM   COVID-19 ([facility])     Status: None   Result Value Ref Range    COVID-19 Negative

## 2023-05-22 ENCOUNTER — LAB (OUTPATIENT)
Dept: LAB | Facility: CLINIC | Age: 43
End: 2023-05-22
Payer: COMMERCIAL

## 2023-05-22 ENCOUNTER — OFFICE VISIT (OUTPATIENT)
Dept: FAMILY MEDICINE | Facility: CLINIC | Age: 43
End: 2023-05-22

## 2023-05-22 VITALS
SYSTOLIC BLOOD PRESSURE: 120 MMHG | WEIGHT: 208.2 LBS | HEIGHT: 65 IN | BODY MASS INDEX: 34.69 KG/M2 | RESPIRATION RATE: 20 BRPM | DIASTOLIC BLOOD PRESSURE: 82 MMHG | TEMPERATURE: 97.3 F

## 2023-05-22 DIAGNOSIS — Z01.818 PREOPERATIVE EXAMINATION: Primary | ICD-10-CM

## 2023-05-22 DIAGNOSIS — J98.01 BRONCHOSPASM: ICD-10-CM

## 2023-05-22 DIAGNOSIS — D17.1 LIPOMA OF BACK: ICD-10-CM

## 2023-05-22 DIAGNOSIS — D69.42 TAR SYNDROME (H): ICD-10-CM

## 2023-05-22 DIAGNOSIS — Q87.2 TAR SYNDROME (H): ICD-10-CM

## 2023-05-22 LAB
ERYTHROCYTE [DISTWIDTH] IN BLOOD BY AUTOMATED COUNT: 15 % (ref 10–15)
HCT VFR BLD AUTO: 46.4 % (ref 40–53)
HGB BLD-MCNC: 15.4 G/DL (ref 13.3–17.7)
MCH RBC QN AUTO: 29.9 PG (ref 26.5–33)
MCHC RBC AUTO-ENTMCNC: 33.2 G/DL (ref 31.5–36.5)
MCV RBC AUTO: 90 FL (ref 78–100)
PLATELET # BLD AUTO: 67 10E3/UL (ref 150–450)
RBC # BLD AUTO: 5.15 10E6/UL (ref 4.4–5.9)
WBC # BLD AUTO: 10.7 10E3/UL (ref 4–11)

## 2023-05-22 PROCEDURE — 99214 OFFICE O/P EST MOD 30 MIN: CPT | Performed by: FAMILY MEDICINE

## 2023-05-22 PROCEDURE — 85027 COMPLETE CBC AUTOMATED: CPT

## 2023-05-22 PROCEDURE — 36415 COLL VENOUS BLD VENIPUNCTURE: CPT

## 2023-05-22 RX ORDER — CETIRIZINE HYDROCHLORIDE 10 MG/1
10 TABLET ORAL DAILY
COMMUNITY
Start: 2023-05-22

## 2023-05-22 ASSESSMENT — ASTHMA QUESTIONNAIRES: ACT_TOTALSCORE: 22

## 2023-05-22 NOTE — NURSING NOTE
Caesar Hernandez is here for a pre-op exam.    Questioned patient about current smoking habits.  Pt. has never smoked.  PULSE regular  My Chart: active  CLASSIFICATION OF OVERWEIGHT AND OBESITY BY BMI                        Obesity Class           BMI(kg/m2)  Underweight                                    < 18.5  Normal                                         18.5-24.9  Overweight                                     25.0-29.9  OBESITY                     I                  30.0-34.9                             II                 35.0-39.9  EXTREME OBESITY             III                >40                            Patient's  BMI Body mass index is 34.65 kg/m .  http://hin.nhlbi.nih.gov/menuplanner/menu.cgi  Pre-visit planning  Immunizations - up to date  Colonoscopy -   Mammogram -   Asthma -   PHQ9 -    CHARITY-7 -      The patient has verbalized that it is ok to leave a detailed voice message on the patient's cell phone with results/recommendations from this visit.

## 2023-05-22 NOTE — PROGRESS NOTES
[facility]  [address] [place] [address]  [address]  [facility] [address]  Phone: [phone]  Fax: [phone]  Primary Provider: Mariajose Vicente  Pre-op Performing Provider: MARIAJOSE VICENTE      PREOPERATIVE EVALUATION:  Today's date: 5/22/2023    Caesar Hernandez is a 42 year old male who presents for a preoperative evaluation.    Surgical Information:  Surgery/Procedure: excision of back lipoma  Surgery Location: [facility]  Surgeon: Dr Jones  Surgery Date: 6/5/23  Time of Surgery: am  Where patient plans to recover: At home with family  Fax number for surgical facility: Note does not need to be faxed, will be available electronically in Epic.    Assessment & Plan     The proposed surgical procedure is considered LOW risk.    Preoperative examination      Lipoma of back      TAR syndrome: thrombocytopenia and absent radius  PLT count in stable range, ranges 57-77 at baseline    Hx bronchospasm, sees Dr Acharya, no current breathing issues             - No identified additional risk factors other than previously addressed    Antiplatelet or Anticoagulation Medication Instructions:   - Patient is on no antiplatelet or anticoagulation medications.    Additional Medication Instructions:  Patient is to take all scheduled medications on the day of surgery    RECOMMENDATION:  APPROVAL GIVEN to proceed with proposed procedure, without further diagnostic evaluation.    Review of the result(s) of each unique test - labs  Ordering of each unique test        Subjective       HPI related to upcoming procedure: lipoma    1. No - Have you ever had a heart attack or stroke?  2. No - Have you ever had surgery on your heart or blood vessels, such as a stent, coronary (heart) bypass, or surgery on an artery in the head, neck, heart, or legs?  3. No - Do you have chest pain when you are physically active?  4. No - Do you have a history of heart failure?  5. No - Do you currently have a cold, bronchitis, or  symptoms of other respiratory (head and chest) infections?  6. No - Do you have a cough, shortness of breath, or wheezing?  7. No - Do you or anyone in your family have a history of blood clots?  8. No - Do you or anyone in your family have a serious bleeding problem, such as long-lasting bleeding after surgeries or cuts?  9. No - Have you ever had anemia or been told to take iron pills?  10. No - Have you had any abnormal blood loss such as black, tarry or bloody stools, or abnormal vaginal bleeding?  11. No - Have you ever had a blood transfusion?  12. Yes - Are you willing to have a blood transfusion if it is medically needed before, during, or after your surgery?  13. No - Have you or anyone in your family ever had problems with anesthesia (sedation for surgery)?  14. No - Do you have sleep apnea, excessive snoring, or daytime drowsiness?   15. No - Do you have any artifical heart valves or other implanted medical devices, such as a pacemaker, defibrillator, or continuous glucose monitor?  16. No - Do you have any artifical joints?  17. No - Are you allergic to latex?  18. No - Is there any chance that you may be pregnant?           View : No data to display.              Health Care Directive:  Patient does not have a Health Care Directive or Living Will: Discussed advance care planning with patient; information given to patient to review.    Preoperative Review of :   reviewed - no record of controlled substances prescribed.      Status of Chronic Conditions:  See problem list for active medical problems.  Problems all longstanding and stable, except as noted/documented.  See ROS for pertinent symptoms related to these conditions.      Review of Systems  CONSTITUTIONAL: NEGATIVE for fever, chills, change in weight  ENT/MOUTH: NEGATIVE for ear, mouth and throat problems  RESP: NEGATIVE for significant cough or SOB  CV: NEGATIVE for chest pain, palpitations or peripheral edema    Patient Active Problem  List    Diagnosis Date Noted     GERD (gastroesophageal reflux disease) 03/27/2019     Priority: Medium     Lipoma of skin and subcutaneous tissue 03/27/2019     Priority: Medium     Vitiligo 07/18/2017     Priority: Medium     Health Care Home 12/27/2016     Priority: Medium     State Tier Level:  Tier 1  Status:  n/a  Care Coordinator:     See Letters for [facility] Care Plan         ACP (advance care planning) 12/27/2016     Priority: Medium              Foot deformity, congenital 12/27/2016     Priority: Medium     Thrombocytopenia (H) 06/24/2015     Priority: Medium     Family history of ischemic heart disease 03/20/2014     Priority: Medium     TAR syndrome: thrombocytopenia and absent radius 03/06/2013     Priority: Medium      Past Medical History:   Diagnosis Date     Coagulation disorder (H)      Complication of anesthesia      Difficult intubation     See anesthesia record from 5/10/2019     Exercise-induced asthma 3/20/2014     Gastro-oesophageal reflux disease      Heart murmur      Other chronic pain     foot pain bilat     Past Surgical History:   Procedure Laterality Date     EXTRACTION(S) DENTAL Bilateral 6/24/2015    Procedure: EXTRACTION(S) DENTAL;  Surgeon: Selam Castillo DMD;  Location: [facility] OR     HERNIA REPAIR  2002     TESTICLE SURGERY  1987    undescended on right     Current Outpatient Medications   Medication Sig Dispense Refill     albuterol (PROAIR HFA/PROVENTIL HFA/VENTOLIN HFA) 108 (90 Base) MCG/ACT inhaler Inhale 2 puffs into the lungs every 4 hours as needed for shortness of breath / dyspnea or wheezing 18 g 0     cetirizine (ZYRTEC) 10 MG tablet Take 1 tablet (10 mg) by mouth daily       Cholecalciferol (VITAMIN D3) 125 MCG (5000 UT) CHEW Take 1 chew tab by mouth daily       fluticasone-salmeterol (ADVAIR) 250-50 MCG/ACT inhaler Inhale 1 puff into the lungs 2 times daily       Multiple Vitamin (MULTI-VITAMIN DAILY PO)        Omeprazole (PRILOSEC PO) Take 20 mg by mouth every morning    "    acetaminophen (TYLENOL) 650 MG CR tablet Take 1 tablet (650 mg) by mouth every 8 hours as needed for mild pain or fever (Patient not taking: Reported on 5/22/2023)       predniSONE (DELTASONE) 10 MG tablet  (Patient not taking: Reported on 5/22/2023)         Allergies   Allergen Reactions     Aspirin      Dust Mites      Bad in winter     Nsaids Unknown     platelet abnormality        Social History     Tobacco Use     Smoking status: Never     Passive exposure: Never     Smokeless tobacco: Never   Vaping Use     Vaping status: Never Used   Substance Use Topics     Alcohol use: Yes     Comment: rare     Family History   Problem Relation Age of Onset     Heart Disease Maternal Grandfather      Chemical Addiction Maternal Grandfather      Heart Disease Paternal Grandfather      Diabetes No family hx of      History   Drug Use No         Objective     Resp 20   Ht 1.651 m (5' 5\")   Wt 94.4 kg (208 lb 3.2 oz)   BMI 34.65 kg/m      Physical Exam  GENERAL APPEARANCE: healthy, alert and no distress  HENT: ear canals and TM's normal and nose and mouth without ulcers or lesions  RESP: lungs clear to auscultation - no rales, rhonchi or wheezes  CV: regular rate and rhythm, normal S1 S2, no S3 or S4 and no murmur, click or rub   ABDOMEN: soft, nontender, no HSM or masses and bowel sounds normal  NEURO: Normal strength and tone, sensory exam grossly normal, mentation intact and speech normal    Recent Labs   Lab Test 08/26/22  1148 11/09/21  0008   HGB 15.0 15.6   PLT 77* 55*     --    POTASSIUM 4.3  --    CR 0.96  --         Diagnostics:  Recent Results (from the past 24 hour(s))   CBC with platelets    Collection Time: 05/22/23 11:50 AM   Result Value Ref Range    WBC Count 10.7 4.0 - 11.0 10e3/uL    RBC Count 5.15 4.40 - 5.90 10e6/uL    Hemoglobin 15.4 13.3 - 17.7 g/dL    Hematocrit 46.4 40.0 - 53.0 %    MCV 90 78 - 100 fL    MCH 29.9 26.5 - 33.0 pg    MCHC 33.2 31.5 - 36.5 g/dL    RDW 15.0 10.0 - 15.0 %    " Platelet Count 67 (L) 150 - 450 10e3/uL      No EKG required, no history of coronary heart disease, significant arrhythmia, peripheral arterial disease or other structural heart disease.    Revised Cardiac Risk Index (RCRI):  The patient has the following serious cardiovascular risks for perioperative complications:   - No serious cardiac risks = 0 points     RCRI Interpretation: 0 points: Class I (very low risk - 0.4% complication rate)           Signed Electronically by: Jose Vicente MD  Copy of this evaluation report is provided to requesting physician.

## 2023-05-25 ENCOUNTER — ALLIED HEALTH/NURSE VISIT (OUTPATIENT)
Dept: FAMILY MEDICINE | Facility: CLINIC | Age: 43
End: 2023-05-25

## 2023-05-25 ENCOUNTER — TRANSFERRED RECORDS (OUTPATIENT)
Dept: FAMILY MEDICINE | Facility: CLINIC | Age: 43
End: 2023-05-25

## 2023-05-25 DIAGNOSIS — Z53.9 ERRONEOUS ENCOUNTER--DISREGARD: Primary | ICD-10-CM

## 2023-05-26 ENCOUNTER — OFFICE VISIT (OUTPATIENT)
Dept: FAMILY MEDICINE | Facility: CLINIC | Age: 43
End: 2023-05-26

## 2023-05-26 VITALS
TEMPERATURE: 97.8 F | OXYGEN SATURATION: 95 % | HEIGHT: 65 IN | HEART RATE: 97 BPM | SYSTOLIC BLOOD PRESSURE: 118 MMHG | DIASTOLIC BLOOD PRESSURE: 68 MMHG | WEIGHT: 208 LBS | BODY MASS INDEX: 34.66 KG/M2
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
## 2023-05-26 PROCEDURE — 99213 OFFICE O/P EST LOW 20 MIN: CPT | Performed by: STUDENT IN AN ORGANIZED HEALTH CARE EDUCATION/TRAINING PROGRAM

## 2023-05-26 NOTE — PATIENT INSTRUCTIONS
Unlikely true tick bite/lyme disease but continue to monitor area for now    Let me know how you're doing, can get blood work at [facility] if needed

## 2023-05-26 NOTE — PROGRESS NOTES
"Assessment & Plan       ICD-10-CM    1. Insect bite, unspecified site, initial encounter  W57.XXXA          Discussed sx mgmt, discussed empiric doxy but hx and exam very unlikely tick/lyme exposure but will get labs drawn if needed.     Patient Instructions   Unlikely true tick bite/lyme disease but continue to monitor area for now    Let me know how you're doing, can get blood work at [facility] if needed     Reasons to follow-up sooner or seek emergent care reviewed.     >20 minutes spent on the date of the encounter doing chart review, history and exam, documentation and further activities per the note    Paul Perea MD, [facility]       Subjective     Caesar Hernandez is a 42 year old male who presents to clinic today for the following health issues:    HPI   Chief Complaint   Patient presents with    Insect Bites     Right abdomen has an insect bite to be examined, very itchy.   No enlargement of lesion  Concern for lyme disease risk  No pain or drainage  No visualized tick   Feeling well               Objective    /68 (BP Location: Left arm, Patient Position: Sitting, Cuff Size: Adult Regular)   Pulse 97   Temp 97.8  F (36.6  C) (Temporal)   Ht 1.651 m (5' 5\")   Wt 94.3 kg (208 lb)   SpO2 95%   BMI 34.61 kg/m    Body mass index is 34.61 kg/m .  Alert, NAD  NC/AT  Sclerae anicteric  RRR  Resp nonlabored  Skin warm and dry, 1cm erythematous lesion, no central clearing, fluctuance or drainage  No focal neuro deficits. Speech intact.   Appropriate affect  Normal gait.          "

## 2023-05-26 NOTE — NURSING NOTE
Chief Complaint   Patient presents with     Insect Bites     Right abdomen has a tick bite to be examined, very itchy         Pre-visit Screening:  Immunizations:  up to date  Colonoscopy:  NA  Mammogram: NA  Asthma Action Test/Plan:  NA  PHQ9:  NA  GAD7:  NA  Questioned patient about current smoking habits Pt. has never smoked.  Ok to leave detailed message on voice mail for today's visit only Yes, phone # [phone]

## 2023-06-03 ENCOUNTER — ANESTHESIA EVENT (OUTPATIENT)
Dept: SURGERY | Facility: CLINIC | Age: 43
End: 2023-06-03
Payer: COMMERCIAL

## 2023-06-04 NOTE — ANESTHESIA PREPROCEDURE EVALUATION
Anesthesia Pre-Procedure Evaluation    Patient: Caesar Hernandez   MRN: [id number] : 1980        Procedure : Procedure(s):  Excision of back lipoma          Past Medical History:   Diagnosis Date     Coagulation disorder (H)      Complication of anesthesia      Difficult intubation     See anesthesia record from 5/10/2019     Exercise-induced asthma 3/20/2014     Gastro-oesophageal reflux disease      Heart murmur      Other chronic pain     foot pain bilat      Past Surgical History:   Procedure Laterality Date     EXTRACTION(S) DENTAL Bilateral 2015    Procedure: EXTRACTION(S) DENTAL;  Surgeon: Selam Castillo DMD;  Location: [facility] OR     HERNIA REPAIR       TESTICLE SURGERY      undescended on right      Allergies   Allergen Reactions     Aspirin      Dust Mites      Bad in winter     Nsaids Unknown     platelet abnormality      Social History     Tobacco Use     Smoking status: Never     Passive exposure: Never     Smokeless tobacco: Never   Vaping Use     Vaping status: Never Used   Substance Use Topics     Alcohol use: Yes     Comment: rare      Wt Readings from Last 1 Encounters:   23 94.3 kg (208 lb)        Anesthesia Evaluation   Pt has had prior anesthetic.     History of anesthetic complications  - difficult airway.      ROS/MED HX  ENT/Pulmonary:     (+) asthma     Neurologic:       Cardiovascular: Comment:  stress test  Interpretation Summary  Probably normal stress echo. (Apical stress image quality is very poor and  entire apex is not well visualized, so sensitivity is reduced.)  A moderate workload was achieved.  The patient exhibited no chest pain during exercise.    (+) -----valvular problems/murmurs     METS/Exercise Tolerance:     Hematologic: Comments: TAR syndrome: thrombocytopenia and absent radius      Musculoskeletal: Comment: Lipoma of skin and subcutaneous tissue  Foot deformity, congenital      GI/Hepatic:     (+) GERD,     Renal/Genitourinary:       Endo:        Psychiatric/Substance Use:       Infectious Disease:       Malignancy:       Other:      (+) , H/O Chronic Pain,        Physical Exam    Airway  airway exam normal      Mallampati: III   TM distance: > 3 FB   Neck ROM: limited   Mouth opening: > 3 cm    Respiratory Devices and Support         Dental       (+) Minor Abnormalities - some fillings, tiny chips      Cardiovascular             Pulmonary                   OUTSIDE LABS:  CBC:   Lab Results   Component Value Date    WBC 10.7 05/22/2023    WBC 7.8 08/26/2022    HGB 15.4 05/22/2023    HGB 15.0 08/26/2022    HCT 46.4 05/22/2023    HCT 47.2 08/26/2022    PLT 67 (L) 05/22/2023    PLT 77 (L) 08/26/2022     BMP:   Lab Results   Component Value Date     08/26/2022     05/26/2020    POTASSIUM 4.3 08/26/2022    POTASSIUM 3.9 05/26/2020    CHLORIDE 104 08/26/2022    CHLORIDE 111 (H) 05/26/2020    CO2 26 08/26/2022    CO2 24 05/26/2020    BUN 17.3 08/26/2022    BUN 20 05/26/2020    CR 0.96 08/26/2022    CR 0.93 05/26/2020    GLC 97 08/26/2022    GLC 98 05/26/2020     COAGS: No results found for: PTT, INR, FIBR  POC: No results found for: BGM, HCG, HCGS  HEPATIC:   Lab Results   Component Value Date    ALBUMIN 4.6 12/15/2022    PROTTOTAL 6.8 12/15/2022    ALT 83 (H) 12/15/2022    AST 33 12/15/2022    ALKPHOS 92 12/15/2022    BILITOTAL 1.0 12/15/2022     OTHER:   Lab Results   Component Value Date    DIANE 9.3 08/26/2022       Anesthesia Plan    ASA Status:  3   NPO Status:  NPO Appropriate    Anesthesia Type: General.     - Airway: ETT   Induction: Intravenous.   Maintenance: Balanced.   Techniques and Equipment:     - Airway: Awake FOI/VL         Consents    Anesthesia Plan(s) and associated risks, benefits, and realistic alternatives discussed. Questions answered and patient/representative(s) expressed understanding.    - Discussed:     - Discussed with:  Patient         Postoperative Care    Pain management: IV analgesics, Multi-modal analgesia.   PONV  prophylaxis: Ondansetron (or other 5HT-3), Dexamethasone or Solumedrol     Comments:    Other Comments: Patient unable to be intubated during last anesthetic. Case cancelled. Patient will need awake fiberoptic intubation for general anesthetic after IV placement.            Live Lund MD

## 2023-06-05 ENCOUNTER — HOSPITAL ENCOUNTER (OUTPATIENT)
Facility: CLINIC | Age: 43
Discharge: HOME | End: 2023-06-05
Attending: SURGERY | Admitting: SURGERY
Payer: COMMERCIAL

## 2023-06-05 ENCOUNTER — PREP FOR PROCEDURE (OUTPATIENT)
Dept: SURGERY | Facility: CLINIC | Age: 43
End: 2023-06-05

## 2023-06-05 ENCOUNTER — ANESTHESIA (OUTPATIENT)
Dept: SURGERY | Facility: CLINIC | Age: 43
End: 2023-06-05
Payer: COMMERCIAL

## 2023-06-05 ENCOUNTER — APPOINTMENT (OUTPATIENT)
Dept: SURGERY | Facility: PHYSICIAN GROUP | Age: 43
End: 2023-06-05
Payer: COMMERCIAL

## 2023-06-05 VITALS
BODY MASS INDEX: 33.32 KG/M2 | SYSTOLIC BLOOD PRESSURE: 132 MMHG | TEMPERATURE: 97.9 F | OXYGEN SATURATION: 95 % | WEIGHT: 207.3 LBS | HEIGHT: 66 IN | RESPIRATION RATE: 18 BRPM | DIASTOLIC BLOOD PRESSURE: 81 MMHG

## 2023-06-05 DIAGNOSIS — D17.30 LIPOMA OF SKIN AND SUBCUTANEOUS TISSUE: Primary | ICD-10-CM

## 2023-06-05 DIAGNOSIS — D17.1 LIPOMA OF BACK: Primary | ICD-10-CM

## 2023-06-05 PROCEDURE — 370N000017 HC ANESTHESIA TECHNICAL FEE, PER MIN: Performed by: SURGERY

## 2023-06-05 PROCEDURE — 360N000075 HC SURGERY LEVEL 2, PER MIN: Performed by: SURGERY

## 2023-06-05 PROCEDURE — 999N000141 HC STATISTIC PRE-PROCEDURE NURSING ASSESSMENT: Performed by: SURGERY

## 2023-06-05 RX ORDER — FENTANYL CITRATE 0.05 MG/ML
50 INJECTION, SOLUTION INTRAMUSCULAR; INTRAVENOUS EVERY 5 MIN PRN
Status: CANCELLED | OUTPATIENT
Start: 2023-06-05

## 2023-06-05 RX ORDER — HYDROMORPHONE HCL IN WATER/PF 6 MG/30 ML
0.4 PATIENT CONTROLLED ANALGESIA SYRINGE INTRAVENOUS EVERY 5 MIN PRN
Status: CANCELLED | OUTPATIENT
Start: 2023-06-05

## 2023-06-05 RX ORDER — FENTANYL CITRATE 0.05 MG/ML
25 INJECTION, SOLUTION INTRAMUSCULAR; INTRAVENOUS EVERY 5 MIN PRN
Status: CANCELLED | OUTPATIENT
Start: 2023-06-05

## 2023-06-05 RX ORDER — CEFAZOLIN SODIUM/WATER 2 G/20 ML
2 SYRINGE (ML) INTRAVENOUS SEE ADMIN INSTRUCTIONS
Status: DISCONTINUED | OUTPATIENT
Start: 2023-06-05 | End: 2023-06-05 | Stop reason: HOSPADM

## 2023-06-05 RX ORDER — HYDROCODONE BITARTRATE AND ACETAMINOPHEN 5; 325 MG/1; MG/1
1 TABLET ORAL EVERY 4 HOURS PRN
Qty: 6 TABLET | Refills: 0 | Status: ON HOLD | OUTPATIENT
Start: 2023-06-05 | End: 2023-06-21

## 2023-06-05 RX ORDER — ONDANSETRON 2 MG/ML
4 INJECTION INTRAMUSCULAR; INTRAVENOUS EVERY 30 MIN PRN
Status: CANCELLED | OUTPATIENT
Start: 2023-06-05

## 2023-06-05 RX ORDER — CEFAZOLIN SODIUM/WATER 2 G/20 ML
2 SYRINGE (ML) INTRAVENOUS
Status: DISCONTINUED | OUTPATIENT
Start: 2023-06-05 | End: 2023-06-05 | Stop reason: HOSPADM

## 2023-06-05 RX ORDER — ONDANSETRON 4 MG/1
4 TABLET, ORALLY DISINTEGRATING ORAL EVERY 30 MIN PRN
Status: CANCELLED | OUTPATIENT
Start: 2023-06-05

## 2023-06-05 RX ORDER — SODIUM CHLORIDE, SODIUM LACTATE, POTASSIUM CHLORIDE, CALCIUM CHLORIDE 600; 310; 30; 20 MG/100ML; MG/100ML; MG/100ML; MG/100ML
INJECTION, SOLUTION INTRAVENOUS CONTINUOUS
Status: CANCELLED | OUTPATIENT
Start: 2023-06-05

## 2023-06-05 RX ORDER — HYDROMORPHONE HCL IN WATER/PF 6 MG/30 ML
0.2 PATIENT CONTROLLED ANALGESIA SYRINGE INTRAVENOUS EVERY 5 MIN PRN
Status: CANCELLED | OUTPATIENT
Start: 2023-06-05

## 2023-06-05 RX ORDER — AMOXICILLIN 250 MG
1-2 CAPSULE ORAL 2 TIMES DAILY PRN
Qty: 15 TABLET | Refills: 0 | Status: ON HOLD | OUTPATIENT
Start: 2023-06-05 | End: 2023-06-21

## 2023-06-05 RX ORDER — HYDROCODONE BITARTRATE AND ACETAMINOPHEN 5; 325 MG/1; MG/1
1-2 TABLET ORAL
Status: CANCELLED | OUTPATIENT
Start: 2023-06-05

## 2023-06-05 ASSESSMENT — ACTIVITIES OF DAILY LIVING (ADL): ADLS_ACUITY_SCORE: 35

## 2023-06-05 NOTE — DISCHARGE INSTRUCTIONS
[facility] - [facility]  Discharge Instructions: Post-Operative Excision of Lipoma    ACTIVITY  Take frequent, short walks and increase your activity gradually.    Avoid strenuous physical activity or heavy lifting greater than 15-20 lbs. for 1-2 weeks or until you feel comfortable.  You may climb stairs.  You may drive without restrictions when you are not using any prescription pain medication and feel comfortable in a car.  You may return to work/school when you are comfortable without any prescription pain medication.    WOUND CARE  You may remove your outer dressing or Band-Aids and shower 48 hours after the surgery.  Pat your incisions dry and leave them open to air.  Re-apply dressing (Band-Aids or gauze/tape) as needed for comfort or drainage.  You may have steri-strips (looks like white tape) or skin glue on your incisions.  You may peel off the steri-strips 2 weeks after your surgery if they have not peeled off on their own.  If you have skin glue, it will peel up and fall off on its own.  Do not soak your incisions in a tub or pool for 2 weeks.   Do not apply any lotions, creams, or ointments to your incision(s).  A ridge under your incision(s) is normal and will gradually resolve.    DIET  Start with liquids, then gradually resume your regular diet as tolerated.   Drink plenty of liquids to stay hydrated.    PAIN  Expect some tenderness and discomfort at the incision site(s).  Use the prescribed pain medication at your discretion.  Expect gradual resolution of your pain over several days.  You may take ibuprofen with food (unless you have been told not to) or acetaminophen/Tylenol instead of or in addition to your prescribed pain medication.  However, if you are taking Norco, do not take any additional acetaminophen/Tylenol.  Do not drink alcohol or drive while you are taking pain medications.  You may apply ice to your incisions in 20 minute intervals as needed for the next 48 hours.   After that time, consider switching to heat if you prefer.    EXPECTATIONS  Pain medications can cause constipation.  Limit use when possible.  Take an over the counter or prescribed stool softener/stimulant, such as Senna-Docusate, 1-2 times a day with plenty of water.  You may also take a mild over the counter laxative, such as Miralax or a Dulcolax suppository, as needed.    You may discontinue these medications once you are having regular bowel movements and/or are no longer taking your narcotic pain medication.    FOLLOW UP  Our office will contact you in approximately 2-3 weeks to check on your progress and answer any questions you may have.  If you are doing well, you will not need to return for a follow up appointment.  If any concerns are identified over the phone, we will help you make an appointment to see a provider.   If you have not received a phone call, have any questions or concerns, or would like to be seen, please call us at [phone] and ask to speak with our nurse.  We are located at [address] [place] [address].    CALL OUR OFFICE -[phone] IF YOU HAVE:   Chills or fever above 101 F.  Increased redness, warmth, or drainage at your incisions.  Significant bleeding.  Pain not relieved by your pain medication or rest.  Increasing pain after the first 48 hours.  Any other concerns or questions.

## 2023-06-05 NOTE — OR NURSING
Patient entered OR and was cancelled prior to IV start.  Dressed and discharged ambulatory with spouse

## 2023-06-05 NOTE — ANESTHESIA POSTPROCEDURE EVALUATION
Patient: Caesar Hernandez    Procedure: Procedure(s):  Excision of back lipoma       Anesthesia Type:  General    Note:  Disposition: Outpatient   Postop Pain Control:    PONV:    Neuro/Psych:    Airway/Respiratory:    CV/Hemodynamics:    Other NRE:    DID A NON-ROUTINE EVENT OCCUR?     Event details/Postop Comments:  Case cancelled in OR. Patient would like to reschedule.           Last vitals:  Vitals:    06/05/23 0542   BP: 132/81   Resp: 18   Temp: 36.6  C (97.9  F)   SpO2: 95%       Electronically Signed By: Live Lund MD  June 5, 2023  8:35 AM

## 2023-06-05 NOTE — PROGRESS NOTES
Anesthesia    One IV attempted in OR. Patient too anxious to allow another IV attempt. Patient would like to reschedule and have pre-op valium to reduce anxiety for IV placement.

## 2023-06-05 NOTE — OP NOTE
Patient was transported to the operating room for planned excision of moderate to large sized right back lipoma.  Patient with difficult airway as well as extremely difficult IV access.  Patient became extremely anxious after attempt was made to place IV.  At patient's request surgery was ultimately canceled prior to the initiation of any surgical procedure.

## 2023-06-07 ENCOUNTER — TELEPHONE (OUTPATIENT)
Dept: SURGERY | Facility: CLINIC | Age: 43
End: 2023-06-07

## 2023-06-07 DIAGNOSIS — F41.9 ANXIETY DUE TO INVASIVE PROCEDURE: Primary | ICD-10-CM

## 2023-06-07 NOTE — TELEPHONE ENCOUNTER
Planned procedure: excision of back lipoma    Date: 6/21/2023    Surgeon: Robert    Patient has anxiety regarding IV placement (recent procedure cancelled because of anxiety).    Called patient and informed him that 5mg of valium will be sent to his pharmacy ([facility] in [place])    Informed him to bring it with to pre op and to notify nursing staff of the medication that will need to be taken. Nursing staff will instruct him as to when to take the medication (after consents are signed).    He agreed    Rx for one tablet of 5 mg valium sent to his pharmacy    He will call PRN    Kerry David, RN-BSN

## 2023-06-21 ENCOUNTER — ANESTHESIA (OUTPATIENT)
Dept: SURGERY | Facility: CLINIC | Age: 43
End: 2023-06-21
Payer: COMMERCIAL

## 2023-06-21 ENCOUNTER — APPOINTMENT (OUTPATIENT)
Dept: SURGERY | Facility: PHYSICIAN GROUP | Age: 43
End: 2023-06-21
Payer: COMMERCIAL

## 2023-06-21 ENCOUNTER — ANESTHESIA EVENT (OUTPATIENT)
Dept: SURGERY | Facility: CLINIC | Age: 43
End: 2023-06-21
Payer: COMMERCIAL

## 2023-06-21 ENCOUNTER — HOSPITAL ENCOUNTER (OUTPATIENT)
Facility: CLINIC | Age: 43
Discharge: HOME | End: 2023-06-21
Attending: SURGERY | Admitting: SURGERY
Payer: COMMERCIAL

## 2023-06-21 VITALS
SYSTOLIC BLOOD PRESSURE: 111 MMHG | HEIGHT: 66 IN | BODY MASS INDEX: 33.37 KG/M2 | TEMPERATURE: 97.3 F | DIASTOLIC BLOOD PRESSURE: 76 MMHG | WEIGHT: 207.6 LBS | RESPIRATION RATE: 16 BRPM | OXYGEN SATURATION: 96 %

## 2023-06-21 DIAGNOSIS — G89.18 POSTOPERATIVE PAIN: Primary | ICD-10-CM

## 2023-06-21 PROCEDURE — 999N000141 HC STATISTIC PRE-PROCEDURE NURSING ASSESSMENT: Performed by: SURGERY

## 2023-06-21 RX ORDER — HYDROCODONE BITARTRATE AND ACETAMINOPHEN 5; 325 MG/1; MG/1
1 TABLET ORAL EVERY 4 HOURS PRN
Qty: 10 TABLET | Refills: 0 | Status: SHIPPED | OUTPATIENT
Start: 2023-06-21

## 2023-06-21 RX ORDER — CEFAZOLIN SODIUM/WATER 2 G/20 ML
2 SYRINGE (ML) INTRAVENOUS SEE ADMIN INSTRUCTIONS
Status: DISCONTINUED | OUTPATIENT
Start: 2023-06-21 | End: 2023-06-21 | Stop reason: HOSPADM

## 2023-06-21 RX ORDER — HYDROCODONE BITARTRATE AND ACETAMINOPHEN 5; 325 MG/1; MG/1
1 TABLET ORAL
Status: CANCELLED | OUTPATIENT
Start: 2023-06-21

## 2023-06-21 RX ORDER — CEFAZOLIN SODIUM/WATER 2 G/20 ML
2 SYRINGE (ML) INTRAVENOUS
Status: DISCONTINUED | OUTPATIENT
Start: 2023-06-21 | End: 2023-06-21 | Stop reason: HOSPADM

## 2023-06-21 RX ORDER — AMOXICILLIN 250 MG
1-2 CAPSULE ORAL 2 TIMES DAILY
Qty: 30 TABLET | Refills: 0 | Status: SHIPPED | OUTPATIENT
Start: 2023-06-21

## 2023-06-21 ASSESSMENT — ACTIVITIES OF DAILY LIVING (ADL): ADLS_ACUITY_SCORE: 35

## 2023-06-21 NOTE — DISCHARGE INSTRUCTIONS
[facility] - [facility]  Discharge Instructions: Post-Operative Excision of Mass or Lesion    ACTIVITY  Take frequent, short walks and increase your activity gradually.    Avoid strenuous physical activity or heavy lifting greater than 15-20 lbs. for 1-2 weeks.  You may climb stairs.  You may drive without restrictions when you are not using any prescription pain medication and feel comfortable in a car.  You may return to work/school when you are comfortable without any prescription pain medication.    WOUND CARE  You may remove your outer dressing or Band-Aids and shower 48 hours after the surgery.  Pat your incisions dry and leave them open to air.  Re-apply dressing (Band-Aids or gauze/tape) as needed for comfort or drainage.  You may have steri-strips (looks like white tape) or skin glue on your incisions.  You may peel off the steri-strips 2 weeks after your surgery if they have not peeled off on their own.  If you have skin glue, it will peel up and fall off on its own.  Do not soak your incisions in a tub or pool for 2 weeks.   Do not apply any lotions, creams, or ointments to your incision(s).  A ridge under your incision(s) is normal and will gradually resolve.    DIET  Start with liquids, then gradually resume your regular diet as tolerated.   Drink plenty of liquids to stay hydrated.    PAIN  Expect some tenderness and discomfort at the incision site(s).  Use the prescribed pain medication at your discretion.  Expect gradual resolution of your pain over several days.  You may take ibuprofen with food (unless you have been told not to) or acetaminophen/Tylenol instead of or in addition to your prescribed pain medication.  However, if you are taking Norco do not take any additional acetaminophen/Tylenol.  Do not drink alcohol or drive while you are taking pain medications.  You may apply ice to your incisions in 20 minute intervals as needed for the next 48 hours.  After that time,  consider switching to heat if you prefer.    EXPECTATIONS  Pain medications can cause constipation.  Limit use when possible.  Take an over the counter or prescribed stool softener/stimulant, such as Colace or Senna, 1-2 times a day with plenty of water.  You may take a mild over the counter laxative, such as Miralax or a suppository, as needed.    You may discontinue these medications once you are having regular bowel movements and/or are no longer taking your narcotic pain medication.    FOLLOW UP  Our office will contact you in approximately 2-3 weeks to check on your progress and answer any questions you may have.  If you are doing well, you will not need to return for a follow up appointment.  If any concerns are identified over the phone, we will help you make an appointment to see a provider.   If you have not received a phone call, have any questions or concerns, or would like to be seen, please call us at [phone] and ask to speak with our nurse.  We are located at [address] [place] [address].    CALL OUR OFFICE -[phone] IF YOU HAVE:   Chills or fever above 101 F.  Increased redness, warmth, or drainage at your incisions.  Significant bleeding.  Pain not relieved by your pain medication or rest.  Increasing pain after the first 48 hours.  Any other concerns or questions.

## 2023-06-21 NOTE — OR NURSING
Per Dr Jones patient will be cancelled due to patients fear of IV stick.  Preop time with patient is one hour.

## 2023-06-21 NOTE — OR NURSING
Dr Hoffman notified of patients desire to take valium po now.  Per Dr Hoffman patient should wait until he talks with him.

## 2023-06-21 NOTE — ANESTHESIA PREPROCEDURE EVALUATION
Anesthesia Pre-Procedure Evaluation    Patient: Caesar Hernandez   MRN: [id number] : 1980        Procedure : Procedure(s):  Excision of back lipoma          Past Medical History:   Diagnosis Date     Coagulation disorder (H)      Complication of anesthesia      Difficult intubation     See anesthesia record from 5/10/2019     Exercise-induced asthma 2014     Gastro-oesophageal reflux disease      Heart murmur      Other chronic pain     foot pain bilat     TAR syndrome      Vitiligo       Past Surgical History:   Procedure Laterality Date     EXTRACTION(S) DENTAL Bilateral 2015    Procedure: EXTRACTION(S) DENTAL;  Surgeon: Selam Castillo DMD;  Location: [facility] OR     HERNIA REPAIR       TESTICLE SURGERY      undescended on right      Allergies   Allergen Reactions     Aspirin      Low plts     Dust Mites      Bad in winter     Nsaids Unknown     platelet abnormality      Social History     Tobacco Use     Smoking status: Never     Passive exposure: Never     Smokeless tobacco: Never   Substance Use Topics     Alcohol use: Yes     Comment: rare      Wt Readings from Last 1 Encounters:   23 94 kg (207 lb 4.8 oz)        Anesthesia Evaluation   Pt has had prior anesthetic.     History of anesthetic complications  - difficult airway.      ROS/MED HX  ENT/Pulmonary:     (+) asthma     Neurologic:       Cardiovascular: Comment:  stress test  Interpretation Summary  Probably normal stress echo. (Apical stress image quality is very poor and  entire apex is not well visualized, so sensitivity is reduced.)  A moderate workload was achieved.  The patient exhibited no chest pain during exercise.    (+) -----valvular problems/murmurs     METS/Exercise Tolerance:     Hematologic: Comments: TAR syndrome: thrombocytopenia and absent radius      Musculoskeletal: Comment: Lipoma of skin and subcutaneous tissue  Foot deformity, congenital      GI/Hepatic:     (+) GERD,     Renal/Genitourinary:        Endo:       Psychiatric/Substance Use:       Infectious Disease:       Malignancy:       Other:      (+) , H/O Chronic Pain,        Physical Exam    Airway  airway exam normal      Mallampati: III   TM distance: > 3 FB   Neck ROM: limited   Mouth opening: > 3 cm    Respiratory Devices and Support         Dental       (+) Minor Abnormalities - some fillings, tiny chips      Cardiovascular             Pulmonary                   OUTSIDE LABS:  CBC:   Lab Results   Component Value Date    WBC 10.7 05/22/2023    WBC 7.8 08/26/2022    HGB 15.4 05/22/2023    HGB 15.0 08/26/2022    HCT 46.4 05/22/2023    HCT 47.2 08/26/2022    PLT 67 (L) 05/22/2023    PLT 77 (L) 08/26/2022     BMP:   Lab Results   Component Value Date     08/26/2022     05/26/2020    POTASSIUM 4.3 08/26/2022    POTASSIUM 3.9 05/26/2020    CHLORIDE 104 08/26/2022    CHLORIDE 111 (H) 05/26/2020    CO2 26 08/26/2022    CO2 24 05/26/2020    BUN 17.3 08/26/2022    BUN 20 05/26/2020    CR 0.96 08/26/2022    CR 0.93 05/26/2020    GLC 97 08/26/2022    GLC 98 05/26/2020     COAGS: No results found for: PTT, INR, FIBR  POC: No results found for: BGM, HCG, HCGS  HEPATIC:   Lab Results   Component Value Date    ALBUMIN 4.6 12/15/2022    PROTTOTAL 6.8 12/15/2022    ALT 83 (H) 12/15/2022    AST 33 12/15/2022    ALKPHOS 92 12/15/2022    BILITOTAL 1.0 12/15/2022     OTHER:   Lab Results   Component Value Date    DIANE 9.3 08/26/2022       Anesthesia Plan    ASA Status:  3   NPO Status:  NPO Appropriate    Anesthesia Type: General.     - Airway: ETT   Induction: Intravenous.   Maintenance: Balanced.   Techniques and Equipment:     - Airway: Awake FOI/VL         Consents    Anesthesia Plan(s) and associated risks, benefits, and realistic alternatives discussed. Questions answered and patient/representative(s) expressed understanding.    - Discussed:     - Discussed with:  Patient         Postoperative Care    Pain management: IV analgesics, Multi-modal  analgesia.   PONV prophylaxis: Ondansetron (or other 5HT-3), Dexamethasone or Solumedrol     Comments:    Other Comments: Patient unable to be intubated during last anesthetic. Case cancelled. Patient will need awake fiberoptic intubation for general anesthetic after IV placement.                NISHI KAUFMAN MD

## 2023-06-22 RX ORDER — DIAZEPAM 5 MG
5 TABLET ORAL EVERY 6 HOURS PRN
Qty: 1 TABLET | Refills: 0 | Status: CANCELLED
Start: 2023-06-22

## 2023-06-23 DIAGNOSIS — D17.1 LIPOMA OF BACK: Primary | ICD-10-CM

## 2023-06-28 ENCOUNTER — TELEPHONE (OUTPATIENT)
Dept: SURGERY | Facility: CLINIC | Age: 43
End: 2023-06-28
Payer: COMMERCIAL

## 2023-07-12 ENCOUNTER — ANCILLARY PROCEDURE (OUTPATIENT)
Dept: MRI IMAGING | Facility: CLINIC | Age: 43
End: 2023-07-12
Attending: SURGERY
Payer: COMMERCIAL

## 2023-07-12 DIAGNOSIS — D17.1 LIPOMA OF BACK: ICD-10-CM

## 2023-07-12 PROCEDURE — 73218 MRI UPPER EXTREMITY W/O DYE: CPT | Mod: RT

## 2023-07-13 NOTE — RESULT ENCOUNTER NOTE
Result reviewed, result normal or expected, with this test result at this time in the absence of any clinical change I am not certain that a follow-up MRI in 6 months is necessary.  Imaging without any thing to cause concern.  Overall feel that surgical intervention at any time in the future is highly unlikely

## 2023-08-04 ENCOUNTER — TRANSFERRED RECORDS (OUTPATIENT)
Dept: FAMILY MEDICINE | Facility: CLINIC | Age: 43
End: 2023-08-04

## 2023-08-11 ENCOUNTER — TRANSFERRED RECORDS (OUTPATIENT)
Dept: FAMILY MEDICINE | Facility: CLINIC | Age: 43
End: 2023-08-11

## 2023-08-25 ENCOUNTER — HOSPITAL ENCOUNTER (OUTPATIENT)
Dept: GENERAL RADIOLOGY | Facility: CLINIC | Age: 43
Discharge: HOME | End: 2023-08-25
Attending: INTERNAL MEDICINE | Admitting: INTERNAL MEDICINE
Payer: COMMERCIAL

## 2023-08-25 DIAGNOSIS — K21.9 CHRONIC GERD: ICD-10-CM

## 2023-08-25 PROCEDURE — 74221 X-RAY XM ESOPHAGUS 2CNTRST: CPT

## 2023-08-25 PROCEDURE — 250N000013 HC RX MED GY IP 250 OP 250 PS 637: Performed by: RADIOLOGY

## 2023-08-25 RX ADMIN — ANTACID/ANTIFLATULENT 4 G: 380; 550; 10; 10 GRANULE, EFFERVESCENT ORAL at 08:14

## 2023-08-30 ENCOUNTER — TRANSFERRED RECORDS (OUTPATIENT)
Dept: FAMILY MEDICINE | Facility: CLINIC | Age: 43
End: 2023-08-30

## 2023-09-05 ENCOUNTER — TRANSFERRED RECORDS (OUTPATIENT)
Dept: FAMILY MEDICINE | Facility: CLINIC | Age: 43
End: 2023-09-05

## 2023-09-18 ENCOUNTER — TRANSFERRED RECORDS (OUTPATIENT)
Dept: FAMILY MEDICINE | Facility: CLINIC | Age: 43
End: 2023-09-18

## 2023-11-17 DIAGNOSIS — D17.1 LIPOMA OF BACK: Primary | ICD-10-CM

## 2024-01-25 ENCOUNTER — TRANSFERRED RECORDS (OUTPATIENT)
Dept: FAMILY MEDICINE | Facility: CLINIC | Age: 44
End: 2024-01-25

## 2024-05-26 ENCOUNTER — HEALTH MAINTENANCE LETTER (OUTPATIENT)
Age: 44
End: 2024-05-26

## 2024-06-25 ENCOUNTER — TRANSFERRED RECORDS (OUTPATIENT)
Dept: FAMILY MEDICINE | Facility: CLINIC | Age: 44
End: 2024-06-25

## 2024-07-22 ENCOUNTER — TRANSFERRED RECORDS (OUTPATIENT)
Dept: FAMILY MEDICINE | Facility: CLINIC | Age: 44
End: 2024-07-22

## 2025-02-27 ENCOUNTER — TRANSFERRED RECORDS (OUTPATIENT)
Dept: FAMILY MEDICINE | Facility: CLINIC | Age: 45
End: 2025-02-27

## 2025-06-09 ENCOUNTER — HOSPITAL ENCOUNTER (EMERGENCY)
Facility: CLINIC | Age: 45
Discharge: HOME | End: 2025-06-09
Attending: EMERGENCY MEDICINE | Admitting: EMERGENCY MEDICINE
Payer: COMMERCIAL

## 2025-06-09 VITALS
HEART RATE: 92 BPM | DIASTOLIC BLOOD PRESSURE: 82 MMHG | OXYGEN SATURATION: 100 % | WEIGHT: 214.95 LBS | SYSTOLIC BLOOD PRESSURE: 140 MMHG | BODY MASS INDEX: 35.81 KG/M2 | RESPIRATION RATE: 18 BRPM | HEIGHT: 65 IN | TEMPERATURE: 98.9 F

## 2025-06-09 DIAGNOSIS — L03.011 PARONYCHIA OF FINGER OF RIGHT HAND: ICD-10-CM

## 2025-06-09 PROCEDURE — 99282 EMERGENCY DEPT VISIT SF MDM: CPT

## 2025-06-09 ASSESSMENT — ACTIVITIES OF DAILY LIVING (ADL): ADLS_ACUITY_SCORE: 41

## 2025-06-09 ASSESSMENT — COLUMBIA-SUICIDE SEVERITY RATING SCALE - C-SSRS
6. HAVE YOU EVER DONE ANYTHING, STARTED TO DO ANYTHING, OR PREPARED TO DO ANYTHING TO END YOUR LIFE?: NO
1. IN THE PAST MONTH, HAVE YOU WISHED YOU WERE DEAD OR WISHED YOU COULD GO TO SLEEP AND NOT WAKE UP?: NO
2. HAVE YOU ACTUALLY HAD ANY THOUGHTS OF KILLING YOURSELF IN THE PAST MONTH?: NO

## 2025-06-09 NOTE — ED PROVIDER NOTES
"  Emergency Department Note      History of Present Illness     Chief Complaint   Wound Check      HPI   Caesar Hernandez is a 44 year old male with past medical history significant for thrombocytopenia and absent radius who presents via car from home with chief complant of wound check. The patient reports right index cuticle discomfort starting yesterday afternoon. The discomfort progressively worsened throughout the day. Yesterday (6/8/25) evening he noticed a yellowish abscess on his finger. This morning he had the abscess drained. This evening he noticed some additional swelling and bruising which prompted this visit. He had chills last night. No fever. No history of blood stream infections. Not on blood thinners. He had 1 dose of bactrim at 1300.    Independent Historian   None    Review of External Notes   I reviewed the family medicine note from 06/09/25. The patient was started on bactrim x2 daily, 7 day course. Also started on bactroban ointment 3x daily.     Past Medical History     Medical History and Problem List   TAR syndrome  Obstructive sleep apnea  Asthma  Difficult intubation    Medications   Albuterol  Omeprazole  Prednisone    Surgical History   Dental extraction  Hernia repair  Testicle surgery    Physical Exam     Patient Vitals for the past 24 hrs:   BP Temp Temp src Pulse Resp SpO2 Height Weight   06/09/25 1901 (!) 140/82 -- -- 92 -- -- -- --   06/09/25 1804 (!) 150/87 98.9  F (37.2  C) Oral 106 18 100 % 1.651 m (5' 5\") 97.5 kg (214 lb 15.2 oz)     Physical Exam  General: Resting on the chair   Head: No obvious trauma to head.  Ears, Nose, Throat:  External ears normal.  Nose normal.    Eyes:  Conjunctivae clear.    CV: Regular rate and rhythm.  No murmurs.      Respiratory: Effort normal and breath sounds normal.  No wheezing or crackles.   Gastrointestinal: Soft.  No distension. There is no tenderness.    Neuro: Alert. Moving all extremities appropriately.  Normal speech.    Skin: Skin is warm " and dry.  Right 2nd finger with hematoma of the dorsal distal phalanx along the nail fold with mild erythema.  No purulence.  Cap refill intact.  Sensation intact.     Diagnostics     Lab Results   Labs Ordered and Resulted from Time of ED Arrival to Time of ED Departure - No data to display    Imaging   No orders to display     Independent Interpretation   None    ED Course      Medications Administered   Medications - No data to display    Procedures   Procedures     Discussion of Management   None    ED Course   ED Course as of 06/10/25 0315   Mon Jun 09, 2025 1837 I obtained history and examined the patient as noted above.    1908 We discussed plan for discharge and the patient is comfortable with this.        Additional Documentation  None    Medical Decision Making / Diagnosis     CMS Diagnoses: None    MIPS   None               MDM   Caesar Hernandez is a 44 year old male who presents to the emergency department with concern for finger infection.  Vital signs are stable.  Broad differential was considered including but not limited to abscess, cellulitis, sepsis.  Overall patient has no systemic signs or symptoms of infection/sepsis.  Redness is actually improved from previous demarcation.  Sensation is intact.  No signs of compartment syndrome.  No recent falls or trauma to suggest need for XR.  There is an ecchymosis along the nail fold of the distal dorsal phalanx, I suspect this is likely secondary to the procedure and related to patient's chronic thrombocytopenia.  I am concerned that any attempt to relieve the hematoma will result in worsening bleeding given his history of thrombocytopenia.  I think elevation and ice would be more appropriate.  It seems from an infection perspective the purulence has been drained and the redness is improving.  I do not see evidence of ongoing or worsening infection on my examination.  With his history of thrombocytopenia and his significant hematoma I have called the [facility]  hand follow-up line to help facilitate outpatient follow-up for the patient.  We have discussed return precautions clinic worsening redness, swelling or purulence.  Discussed return to the ER.  Recommendation for close follow-up with [facility] for hand recheck.  Patient is agreeable.    Disposition   The patient was discharged.     Diagnosis     ICD-10-CM    1. Paronychia of finger of right hand  L03.011            Discharge Medications   Discharge Medication List as of 6/9/2025  7:06 PM        Scribe Disclosure:  I, Porfirio Munoz, am serving as a scribe at 6:59 PM on 6/9/2025 to document services personally performed by Heidi Melgoza MD based on my observations and the provider's statements to me.        Heidi Melgoza MD  06/10/25 1695

## 2025-06-09 NOTE — ED TRIAGE NOTES
Recent medical problems. Coming off prednisone from asthma exasperation. Pt noticed worsening cellulitis this morning. Went to clinic this morning and had wound cultured. Around 1100 started on abx. Pt worried because of the puss and was told to come in if it was looking worse.

## 2025-06-10 NOTE — DISCHARGE INSTRUCTIONS
Please return to the the ED if you notice increasing redness, warmth, swelling, drainage or fevers, this is concerning for infection.  Keep wound clean and dry, wash after the first 24 hours with warm soapy water.      I left a voicemail with [facility] to help arrange outpatient follow up and wound recheck

## 2025-06-14 ENCOUNTER — HEALTH MAINTENANCE LETTER (OUTPATIENT)
Age: 45
End: 2025-06-14

## 2025-08-21 ENCOUNTER — HOSPITAL ENCOUNTER (EMERGENCY)
Facility: CLINIC | Age: 45
Discharge: HOME | End: 2025-08-21
Attending: PHYSICIAN ASSISTANT | Admitting: PHYSICIAN ASSISTANT
Payer: COMMERCIAL

## 2025-08-21 VITALS
TEMPERATURE: 97 F | OXYGEN SATURATION: 96 % | WEIGHT: 221.12 LBS | BODY MASS INDEX: 35.54 KG/M2 | HEART RATE: 93 BPM | DIASTOLIC BLOOD PRESSURE: 81 MMHG | RESPIRATION RATE: 16 BRPM | HEIGHT: 66 IN | SYSTOLIC BLOOD PRESSURE: 128 MMHG

## 2025-08-21 DIAGNOSIS — H57.89 REDNESS OF RIGHT EYE: Primary | ICD-10-CM

## 2025-08-21 PROCEDURE — 99283 EMERGENCY DEPT VISIT LOW MDM: CPT

## 2025-08-21 PROCEDURE — 250N000009 HC RX 250: Performed by: PHYSICIAN ASSISTANT

## 2025-08-21 PROCEDURE — 99283 EMERGENCY DEPT VISIT LOW MDM: CPT | Performed by: PHYSICIAN ASSISTANT

## 2025-08-21 RX ORDER — FLUORESCEIN SODIUM 1 MG/MG
1 STRIP OPHTHALMIC ONCE
Status: COMPLETED | OUTPATIENT
Start: 2025-08-21 | End: 2025-08-21

## 2025-08-21 RX ORDER — TETRACAINE HYDROCHLORIDE 5 MG/ML
2 SOLUTION OPHTHALMIC ONCE
Status: COMPLETED | OUTPATIENT
Start: 2025-08-21 | End: 2025-08-21

## 2025-08-21 RX ORDER — OLOPATADINE HYDROCHLORIDE 1 MG/ML
1 SOLUTION OPHTHALMIC 2 TIMES DAILY
Qty: 10 ML | Refills: 0 | Status: SHIPPED | OUTPATIENT
Start: 2025-08-21 | End: 2025-08-28

## 2025-08-21 RX ADMIN — FLUORESCEIN SODIUM 1 STRIP: 1 STRIP OPHTHALMIC at 17:50

## 2025-08-21 RX ADMIN — TETRACAINE HYDROCHLORIDE 2 DROP: 5 SOLUTION OPHTHALMIC at 17:51

## 2025-08-21 ASSESSMENT — VISUAL ACUITY
OU: 1
OS: WITH CORRECTIVE LENSES;20/25
OD: 20/25;WITH CORRECTIVE LENSES

## 2025-08-21 ASSESSMENT — ACTIVITIES OF DAILY LIVING (ADL): ADLS_ACUITY_SCORE: 41

## 2025-08-21 ASSESSMENT — TONOMETRY
IOP_HANDHELD: 1
OD_IOP_MMHG: 14
OS_IOP_MMHG: 12

## 2025-08-26 ENCOUNTER — LAB (OUTPATIENT)
Dept: LAB | Facility: CLINIC | Age: 45
End: 2025-08-26
Payer: COMMERCIAL

## 2025-08-26 DIAGNOSIS — D69.6 THROMBOCYTOPENIA: Primary | ICD-10-CM

## 2025-08-26 LAB
ALBUMIN SERPL BCG-MCNC: 4.7 G/DL (ref 3.5–5.2)
ALP SERPL-CCNC: 87 U/L (ref 40–150)
ALT SERPL W P-5'-P-CCNC: 94 U/L (ref 0–70)
ANION GAP SERPL CALCULATED.3IONS-SCNC: 12 MMOL/L (ref 7–15)
AST SERPL W P-5'-P-CCNC: 47 U/L (ref 0–45)
BILIRUB SERPL-MCNC: 1.1 MG/DL
BUN SERPL-MCNC: 20.3 MG/DL (ref 6–20)
CALCIUM SERPL-MCNC: 9.4 MG/DL (ref 8.8–10.4)
CHLORIDE SERPL-SCNC: 105 MMOL/L (ref 98–107)
CHOLEST SERPL-MCNC: 228 MG/DL
CREAT SERPL-MCNC: 1.16 MG/DL (ref 0.67–1.17)
EGFRCR SERPLBLD CKD-EPI 2021: 80 ML/MIN/1.73M2
ERYTHROCYTE [DISTWIDTH] IN BLOOD BY AUTOMATED COUNT: 15.6 % (ref 10–15)
FASTING STATUS PATIENT QL REPORTED: YES
GLUCOSE SERPL-MCNC: 84 MG/DL (ref 70–99)
HCO3 SERPL-SCNC: 25 MMOL/L (ref 22–29)
HCT VFR BLD AUTO: 46.4 % (ref 40–53)
HDLC SERPL-MCNC: 54 MG/DL
HGB BLD-MCNC: 15.5 G/DL (ref 13.3–17.7)
LDLC SERPL CALC-MCNC: 142 MG/DL
MCH RBC QN AUTO: 29 PG (ref 26.5–33)
MCHC RBC AUTO-ENTMCNC: 33.4 G/DL (ref 31.5–36.5)
MCV RBC AUTO: 86.9 FL (ref 78–100)
NONHDLC SERPL-MCNC: 174 MG/DL
PLATELET # BLD AUTO: 61 10E3/UL (ref 150–450)
POTASSIUM SERPL-SCNC: 3.7 MMOL/L (ref 3.4–5.3)
PROT SERPL-MCNC: 7.4 G/DL (ref 6.4–8.3)
RBC # BLD AUTO: 5.34 10E6/UL (ref 4.4–5.9)
SODIUM SERPL-SCNC: 142 MMOL/L (ref 135–145)
TRIGL SERPL-MCNC: 162 MG/DL
WBC # BLD AUTO: 10.69 10E3/UL (ref 4–11)

## 2025-08-26 PROCEDURE — 80061 LIPID PANEL: CPT

## 2025-08-26 PROCEDURE — 36415 COLL VENOUS BLD VENIPUNCTURE: CPT

## 2025-08-26 PROCEDURE — 85014 HEMATOCRIT: CPT

## 2025-08-26 PROCEDURE — 80053 COMPREHEN METABOLIC PANEL: CPT

## 2025-09-02 ENCOUNTER — TRANSFERRED RECORDS (OUTPATIENT)
Dept: FAMILY MEDICINE | Facility: CLINIC | Age: 45
End: 2025-09-02

## (undated) DEVICE — DRAPE LAP W/ARMBOARD 29410

## (undated) DEVICE — GLOVE PROTEXIS W/NEU-THERA 8.0  2D73TE80

## (undated) DEVICE — SOL NACL 0.9% IRRIG 1000ML BOTTLE 2F7124

## (undated) DEVICE — SU MONOCRYL 4-0 PS-2 18" UND Y496G

## (undated) DEVICE — GOWN IMPERVIOUS SPECIALTY XLG/XLONG 32474

## (undated) DEVICE — SU VICRYL 3-0 SH 27" J316H

## (undated) DEVICE — DRSG STERI STRIP 1/2X4" R1547

## (undated) DEVICE — ESU PENCIL W/SMOKE EVAC NEPTUNE STRYKER 0703-046-000

## (undated) DEVICE — NDL 22GA 1.5"

## (undated) DEVICE — LINEN TOWEL PACK X5 5464

## (undated) DEVICE — DECANTER VIAL 2006S

## (undated) DEVICE — PACK MINOR SBA15MIFSE

## (undated) DEVICE — SYR 10ML SLIP TIP W/O NDL

## (undated) DEVICE — PREP CHLORAPREP 26ML TINTED ORANGE  260815

## (undated) DEVICE — SOL WATER IRRIG 1000ML BOTTLE 2F7114

## (undated) DEVICE — NDL 19GA 1.5"

## (undated) DEVICE — DECANTER BAG 2002S

## (undated) DEVICE — PREP CHLORAPREP 26ML TINTED HI-LITE ORANGE 930815

## (undated) DEVICE — SOL NACL 0.9% IRRIG 1000ML BOTTLE 07138-09

## (undated) DEVICE — GLOVE BIOGEL PI MICRO SZ 8.0 48580

## (undated) RX ORDER — PROPOFOL 10 MG/ML
INJECTION, EMULSION INTRAVENOUS
Status: DISPENSED
Start: 2023-06-05

## (undated) RX ORDER — LIDOCAINE HYDROCHLORIDE 10 MG/ML
INJECTION, SOLUTION INFILTRATION; PERINEURAL
Status: DISPENSED
Start: 2023-06-05

## (undated) RX ORDER — PROPOFOL 10 MG/ML
INJECTION, EMULSION INTRAVENOUS
Status: DISPENSED
Start: 2019-05-10

## (undated) RX ORDER — DEXAMETHASONE SODIUM PHOSPHATE 4 MG/ML
INJECTION, SOLUTION INTRA-ARTICULAR; INTRALESIONAL; INTRAMUSCULAR; INTRAVENOUS; SOFT TISSUE
Status: DISPENSED
Start: 2023-06-21

## (undated) RX ORDER — ONDANSETRON 2 MG/ML
INJECTION INTRAMUSCULAR; INTRAVENOUS
Status: DISPENSED
Start: 2023-06-21

## (undated) RX ORDER — BUPIVACAINE HYDROCHLORIDE AND EPINEPHRINE 5; 5 MG/ML; UG/ML
INJECTION, SOLUTION EPIDURAL; INTRACAUDAL; PERINEURAL
Status: DISPENSED
Start: 2023-06-05

## (undated) RX ORDER — FENTANYL CITRATE 50 UG/ML
INJECTION, SOLUTION INTRAMUSCULAR; INTRAVENOUS
Status: DISPENSED
Start: 2023-06-21

## (undated) RX ORDER — CEFAZOLIN SODIUM 2 G/100ML
INJECTION, SOLUTION INTRAVENOUS
Status: DISPENSED
Start: 2019-04-01

## (undated) RX ORDER — FENTANYL CITRATE 50 UG/ML
INJECTION, SOLUTION INTRAMUSCULAR; INTRAVENOUS
Status: DISPENSED
Start: 2019-05-10

## (undated) RX ORDER — CEFAZOLIN SODIUM 2 G/100ML
INJECTION, SOLUTION INTRAVENOUS
Status: DISPENSED
Start: 2019-05-10

## (undated) RX ORDER — FENTANYL CITRATE 50 UG/ML
INJECTION, SOLUTION INTRAMUSCULAR; INTRAVENOUS
Status: DISPENSED
Start: 2023-06-05

## (undated) RX ORDER — CEFAZOLIN SODIUM/WATER 2 G/20 ML
SYRINGE (ML) INTRAVENOUS
Status: DISPENSED
Start: 2023-06-21